# Patient Record
Sex: FEMALE | Race: BLACK OR AFRICAN AMERICAN | NOT HISPANIC OR LATINO | Employment: FULL TIME | URBAN - METROPOLITAN AREA
[De-identification: names, ages, dates, MRNs, and addresses within clinical notes are randomized per-mention and may not be internally consistent; named-entity substitution may affect disease eponyms.]

---

## 2022-12-28 ENCOUNTER — PATIENT OUTREACH (OUTPATIENT)
Dept: ADMINISTRATIVE | Facility: HOSPITAL | Age: 32
End: 2022-12-28
Payer: COMMERCIAL

## 2022-12-28 ENCOUNTER — OFFICE VISIT (OUTPATIENT)
Dept: INTERNAL MEDICINE | Facility: CLINIC | Age: 32
End: 2022-12-28
Payer: COMMERCIAL

## 2022-12-28 ENCOUNTER — LAB VISIT (OUTPATIENT)
Dept: LAB | Facility: HOSPITAL | Age: 32
End: 2022-12-28
Attending: INTERNAL MEDICINE
Payer: COMMERCIAL

## 2022-12-28 DIAGNOSIS — Z11.59 SCREENING FOR VIRAL DISEASE: ICD-10-CM

## 2022-12-28 DIAGNOSIS — Z80.9 FAMILY HISTORY OF CANCER: ICD-10-CM

## 2022-12-28 DIAGNOSIS — Z00.00 ANNUAL PHYSICAL EXAM: Primary | ICD-10-CM

## 2022-12-28 DIAGNOSIS — Z11.4 SCREENING FOR HIV (HUMAN IMMUNODEFICIENCY VIRUS): ICD-10-CM

## 2022-12-28 DIAGNOSIS — E66.09 CLASS 2 OBESITY DUE TO EXCESS CALORIES WITHOUT SERIOUS COMORBIDITY WITH BODY MASS INDEX (BMI) OF 39.0 TO 39.9 IN ADULT: ICD-10-CM

## 2022-12-28 DIAGNOSIS — Z00.00 ANNUAL PHYSICAL EXAM: ICD-10-CM

## 2022-12-28 PROBLEM — E66.812 CLASS 2 OBESITY DUE TO EXCESS CALORIES WITHOUT SERIOUS COMORBIDITY WITH BODY MASS INDEX (BMI) OF 39.0 TO 39.9 IN ADULT: Status: ACTIVE | Noted: 2022-12-28

## 2022-12-28 LAB
ALBUMIN SERPL BCP-MCNC: 3.7 G/DL (ref 3.5–5.2)
ALP SERPL-CCNC: 48 U/L (ref 55–135)
ALT SERPL W/O P-5'-P-CCNC: 12 U/L (ref 10–44)
ANION GAP SERPL CALC-SCNC: 12 MMOL/L (ref 8–16)
AST SERPL-CCNC: 16 U/L (ref 10–40)
BASOPHILS # BLD AUTO: 0.04 K/UL (ref 0–0.2)
BASOPHILS NFR BLD: 0.7 % (ref 0–1.9)
BILIRUB SERPL-MCNC: 0.3 MG/DL (ref 0.1–1)
BUN SERPL-MCNC: 11 MG/DL (ref 6–20)
CALCIUM SERPL-MCNC: 9.2 MG/DL (ref 8.7–10.5)
CHLORIDE SERPL-SCNC: 102 MMOL/L (ref 95–110)
CHOLEST SERPL-MCNC: 225 MG/DL (ref 120–199)
CHOLEST/HDLC SERPL: 2.4 {RATIO} (ref 2–5)
CO2 SERPL-SCNC: 25 MMOL/L (ref 23–29)
CREAT SERPL-MCNC: 0.8 MG/DL (ref 0.5–1.4)
DIFFERENTIAL METHOD: ABNORMAL
EOSINOPHIL # BLD AUTO: 0.1 K/UL (ref 0–0.5)
EOSINOPHIL NFR BLD: 2.4 % (ref 0–8)
ERYTHROCYTE [DISTWIDTH] IN BLOOD BY AUTOMATED COUNT: 13.6 % (ref 11.5–14.5)
EST. GFR  (NO RACE VARIABLE): >60 ML/MIN/1.73 M^2
ESTIMATED AVG GLUCOSE: 105 MG/DL (ref 68–131)
GLUCOSE SERPL-MCNC: 83 MG/DL (ref 70–110)
HBA1C MFR BLD: 5.3 % (ref 4–5.6)
HCT VFR BLD AUTO: 36.5 % (ref 37–48.5)
HCV AB SERPL QL IA: NORMAL
HDLC SERPL-MCNC: 93 MG/DL (ref 40–75)
HDLC SERPL: 41.3 % (ref 20–50)
HGB BLD-MCNC: 11.5 G/DL (ref 12–16)
HIV 1+2 AB+HIV1 P24 AG SERPL QL IA: NORMAL
IMM GRANULOCYTES # BLD AUTO: 0.01 K/UL (ref 0–0.04)
IMM GRANULOCYTES NFR BLD AUTO: 0.2 % (ref 0–0.5)
LDLC SERPL CALC-MCNC: 117 MG/DL (ref 63–159)
LYMPHOCYTES # BLD AUTO: 2.2 K/UL (ref 1–4.8)
LYMPHOCYTES NFR BLD: 39.8 % (ref 18–48)
MCH RBC QN AUTO: 27.9 PG (ref 27–31)
MCHC RBC AUTO-ENTMCNC: 31.5 G/DL (ref 32–36)
MCV RBC AUTO: 89 FL (ref 82–98)
MONOCYTES # BLD AUTO: 0.4 K/UL (ref 0.3–1)
MONOCYTES NFR BLD: 7.3 % (ref 4–15)
NEUTROPHILS # BLD AUTO: 2.7 K/UL (ref 1.8–7.7)
NEUTROPHILS NFR BLD: 49.6 % (ref 38–73)
NONHDLC SERPL-MCNC: 132 MG/DL
NRBC BLD-RTO: 0 /100 WBC
PLATELET # BLD AUTO: 386 K/UL (ref 150–450)
PMV BLD AUTO: 10.1 FL (ref 9.2–12.9)
POTASSIUM SERPL-SCNC: 4.2 MMOL/L (ref 3.5–5.1)
PROT SERPL-MCNC: 7.2 G/DL (ref 6–8.4)
RBC # BLD AUTO: 4.12 M/UL (ref 4–5.4)
SODIUM SERPL-SCNC: 139 MMOL/L (ref 136–145)
TRIGL SERPL-MCNC: 75 MG/DL (ref 30–150)
TSH SERPL DL<=0.005 MIU/L-ACNC: 1.32 UIU/ML (ref 0.4–4)
WBC # BLD AUTO: 5.5 K/UL (ref 3.9–12.7)

## 2022-12-28 PROCEDURE — 36415 COLL VENOUS BLD VENIPUNCTURE: CPT | Performed by: INTERNAL MEDICINE

## 2022-12-28 PROCEDURE — 1159F PR MEDICATION LIST DOCUMENTED IN MEDICAL RECORD: ICD-10-PCS | Mod: CPTII,S$GLB,, | Performed by: INTERNAL MEDICINE

## 2022-12-28 PROCEDURE — 83036 HEMOGLOBIN GLYCOSYLATED A1C: CPT | Performed by: INTERNAL MEDICINE

## 2022-12-28 PROCEDURE — 99999 PR PBB SHADOW E&M-NEW PATIENT-LVL III: ICD-10-PCS | Mod: PBBFAC,,, | Performed by: INTERNAL MEDICINE

## 2022-12-28 PROCEDURE — 1159F MED LIST DOCD IN RCRD: CPT | Mod: CPTII,S$GLB,, | Performed by: INTERNAL MEDICINE

## 2022-12-28 PROCEDURE — 99385 PREV VISIT NEW AGE 18-39: CPT | Mod: S$GLB,,, | Performed by: INTERNAL MEDICINE

## 2022-12-28 PROCEDURE — 84443 ASSAY THYROID STIM HORMONE: CPT | Performed by: INTERNAL MEDICINE

## 2022-12-28 PROCEDURE — 80061 LIPID PANEL: CPT | Performed by: INTERNAL MEDICINE

## 2022-12-28 PROCEDURE — 86803 HEPATITIS C AB TEST: CPT | Performed by: INTERNAL MEDICINE

## 2022-12-28 PROCEDURE — 99385 PR PREVENTIVE VISIT,NEW,18-39: ICD-10-PCS | Mod: S$GLB,,, | Performed by: INTERNAL MEDICINE

## 2022-12-28 PROCEDURE — 87389 HIV-1 AG W/HIV-1&-2 AB AG IA: CPT | Performed by: INTERNAL MEDICINE

## 2022-12-28 PROCEDURE — 85025 COMPLETE CBC W/AUTO DIFF WBC: CPT | Performed by: INTERNAL MEDICINE

## 2022-12-28 PROCEDURE — 99999 PR PBB SHADOW E&M-NEW PATIENT-LVL III: CPT | Mod: PBBFAC,,, | Performed by: INTERNAL MEDICINE

## 2022-12-28 PROCEDURE — 80053 COMPREHEN METABOLIC PANEL: CPT | Performed by: INTERNAL MEDICINE

## 2022-12-28 RX ORDER — LORAZEPAM 1 MG
1000 TABLET ORAL DAILY
COMMUNITY

## 2022-12-28 RX ORDER — PEDIATRIC MULTIVIT 61/D3/VIT K 1500-800
1 CAPSULE ORAL DAILY
COMMUNITY

## 2022-12-28 RX ORDER — PHENYLEPHRINE HCL 10 MG
1 TABLET ORAL DAILY
COMMUNITY

## 2022-12-28 NOTE — PROGRESS NOTES
[facility] Note    Chief Complaint      Chief Complaint   Patient presents with    Establish Care     History of Present Illness      Gia Sawant is a 32 y.o. female who presents today for Annual preventative visit.  Patient comes to appointment alone.    Problem List Items Addressed This Visit       Class 2 obesity due to excess calories without serious comorbidity with body mass index (BMI) of 39.0 to 39.9 in adult    Current Assessment & Plan     Does Pilates 2 times per week.  Walks 3-5 miles in the park multiple times per week. Diet is good when she is exercising.          Other Visit Diagnoses       Annual physical exam    -  Primary    Relevant Orders    CBC Auto Differential    Lipid Panel    Comprehensive Metabolic Panel    Hemoglobin A1C    TSH    Screening for HIV (human immunodeficiency virus)        Relevant Orders    HIV 1/2 Ag/Ab (4th Gen)    Screening for viral disease        Relevant Orders    Hepatitis C Antibody            Health Maintenance   Topic Date Due    Hepatitis C Screening  Never done    Lipid Panel  Never done    TETANUS VACCINE  03/08/2015       History reviewed. No pertinent past medical history.    History reviewed. No pertinent surgical history.    family history includes Arthritis in her father; Colon cancer in her paternal aunt and paternal cousin; Diabetes in her father, mother, sister, and sister; Heart disease in her father; Hyperlipidemia in her father and mother; Hypertension in her father, mother, sister, and sister; Miscarriages / Stillbirths in her mother and sister; Stomach cancer in her mother.    Social History     Tobacco Use    Smoking status: Never    Smokeless tobacco: Never   Substance Use Topics    Alcohol use: Yes     Comment: Special occasions (holidays, birthdays)    Drug use: Yes     Types: Marijuana     Comment: While on vacation       Review of Systems   Constitutional:  Negative for chills and fever.   HENT:  Negative for sore throat.   "  Respiratory:  Negative for cough and shortness of breath.    Cardiovascular:  Negative for chest pain and palpitations.   Gastrointestinal:  Negative for constipation, diarrhea, nausea and vomiting.   Genitourinary:  Negative for dysuria and hematuria.   Musculoskeletal:  Negative for falls.   Neurological:  Negative for headaches.      Outpatient Encounter Medications as of 12/28/2022   Medication Sig Dispense Refill    biotin 10,000 mcg Chew Take 1 capsule by mouth once daily.      cinnamon bark 500 mg capsule Take 1 capsule by mouth once daily.      Lacto-Bif-Sac-Bacil-Strep-bact (MVW COMPLETE FORMUL PROBIOTIC) 40 billion cell -15 mg CpDR Take 1 capsule by mouth once daily.      omega-3 fatty acids fish oil 1,050-1,200 mg Cap capsule Take 1 capsule by mouth once daily.      safflower oil-linoleic acid,co (CLA) 1,000 mg Cap Take 1,000 mg by mouth once daily.       No facility-administered encounter medications on file as of 12/28/2022.        Review of patient's allergies indicates:  No Known Allergies    Physical Exam      Vital Signs  Temp: (P) 98.8 °F (37.1 °C)  Pulse: (P) 82  SpO2: (P) 98 %  BP: (P) 118/76  Pain Score: (P) 0-No pain  Height and Weight  Height: (P) 5' 10" (177.8 cm)  Weight: (P) 123.7 kg (272 lb 11.3 oz)  BSA (Calculated - sq m): (P) 2.47 sq meters  BMI (Calculated): (P) 39.1  Weight in (lb) to have BMI = 25: (P) 173.9]    Physical Exam  Constitutional:       Appearance: She is well-developed.   HENT:      Head: Normocephalic and atraumatic.      Right Ear: External ear normal.      Left Ear: External ear normal.   Eyes:      General:         Right eye: No discharge.         Left eye: No discharge.   Cardiovascular:      Rate and Rhythm: Normal rate and regular rhythm.      Heart sounds: Normal heart sounds. No murmur heard.  Pulmonary:      Effort: Pulmonary effort is normal. No respiratory distress.      Breath sounds: Normal breath sounds.   Abdominal:      General: There is no distension. "      Palpations: Abdomen is soft.      Tenderness: There is no abdominal tenderness. There is no guarding.   Musculoskeletal:         General: Normal range of motion.      Cervical back: Normal range of motion.   Skin:     General: Skin is warm and dry.   Neurological:      Mental Status: She is alert and oriented to person, place, and time.   Psychiatric:         Behavior: Behavior normal.        Laboratory:  CBC:  No results for input(s): WBC, RBC, HGB, HCT, PLT, MCV, MCH, MCHC in the last 2160 hours.  CMP:  No results for input(s): GLU, CALCIUM, ALBUMIN, PROT, NA, K, CO2, CL, BUN, ALKPHOS, ALT, AST, BILITOT in the last 2160 hours.    Invalid input(s): CREATININ  URINALYSIS:  No results for input(s): COLORU, CLARITYU, SPECGRAV, PHUR, PROTEINUA, GLUCOSEU, BILIRUBINCON, BLOODU, WBCU, RBCU, BACTERIA, MUCUS, NITRITE, LEUKOCYTESUR, UROBILINOGEN, HYALINECASTS in the last 2160 hours.   LIPIDS:  No results for input(s): TSH, HDL, CHOL, TRIG, LDLCALC, CHOLHDL, NONHDLCHOL, TOTALCHOLEST in the last 2160 hours.  TSH:  No results for input(s): TSH in the last 2160 hours.  A1C:  No results for input(s): HGBA1C in the last 2160 hours.    Radiology:  No results found in the last 30 days.     Assessment/Plan     Gia Sawant is a 32 y.o.female with:    1. Annual physical exam  - CBC Auto Differential; Future  - Lipid Panel; Future  - Comprehensive Metabolic Panel; Future  - Hemoglobin A1C; Future  - TSH; Future    2. Screening for HIV (human immunodeficiency virus)  - HIV 1/2 Ag/Ab (4th Gen); Future    3. Screening for viral disease  - Hepatitis C Antibody; Future    4. Class 2 obesity due to excess calories without serious comorbidity with body mass index (BMI) of 39.0 to 39.9 in adult      -Continue current medications and maintain follow up with specialists.    -Follow up in about 1 year (around 12/28/2023) for Annual Visit.       Milla Greenberg MD  [facility]

## 2022-12-28 NOTE — LETTER
AUTHORIZATION FOR RELEASE OF   CONFIDENTIAL INFORMATION    Dear [facility],    We are seeing Gia Sawant, date of birth 1990, in the clinic at [facility]. Milla Greenberg MD is the patient's PCP. Gia Sawant has an outstanding lab/procedure at the time we reviewed her chart. In order to help keep her health information updated, she has authorized us to request the following medical record(s):        (  )  MAMMOGRAM                                      (  )  COLONOSCOPY      (XX)  PAP SMEAR                                          (  )  OUTSIDE LAB RESULTS     (  )  DEXA SCAN                                          (  )  EYE EXAM            (  )  FOOT EXAM                                          (  )  ENTIRE RECORD     (  )  OUTSIDE IMMUNIZATIONS                 (  )  _______________         Please fax records to Milla Greenberg MD, [phone].     If you have any questions, please contact EDGARDO Rivera at [phone].           Patient Name: Gia Sawant  : 1990  Patient Phone #: [phone]

## 2022-12-28 NOTE — ASSESSMENT & PLAN NOTE
Does Pilates 2 times per week.  Walks 3-5 miles in the park multiple times per week. Diet is good when she is exercising.

## 2022-12-28 NOTE — PROGRESS NOTES
Health Maintenance Due   Topic Date Due    Cervical Cancer Screening  Never done    TETANUS VACCINE  03/08/2015    COVID-19 Vaccine (3 - Booster for Pfizer series) 12/02/2021     Chart review done. HM updated. Immunizations reviewed & updated. Care Everywhere updated.  SYL sent to [facility] for Pap results.

## 2022-12-29 ENCOUNTER — PATIENT MESSAGE (OUTPATIENT)
Dept: INTERNAL MEDICINE | Facility: CLINIC | Age: 32
End: 2022-12-29
Payer: COMMERCIAL

## 2022-12-29 DIAGNOSIS — D64.9 ANEMIA, UNSPECIFIED TYPE: Primary | ICD-10-CM

## 2022-12-30 ENCOUNTER — LAB VISIT (OUTPATIENT)
Dept: LAB | Facility: HOSPITAL | Age: 32
End: 2022-12-30
Attending: INTERNAL MEDICINE
Payer: COMMERCIAL

## 2022-12-30 DIAGNOSIS — D64.9 ANEMIA, UNSPECIFIED TYPE: ICD-10-CM

## 2022-12-30 LAB
FERRITIN SERPL-MCNC: 23 NG/ML (ref 20–300)
IRON SERPL-MCNC: 62 UG/DL (ref 30–160)
SATURATED IRON: 12 % (ref 20–50)
TOTAL IRON BINDING CAPACITY: 499 UG/DL (ref 250–450)
TRANSFERRIN SERPL-MCNC: 337 MG/DL (ref 200–375)

## 2022-12-30 PROCEDURE — 36415 COLL VENOUS BLD VENIPUNCTURE: CPT | Performed by: INTERNAL MEDICINE

## 2022-12-30 PROCEDURE — 82728 ASSAY OF FERRITIN: CPT | Performed by: INTERNAL MEDICINE

## 2022-12-30 PROCEDURE — 84466 ASSAY OF TRANSFERRIN: CPT | Performed by: INTERNAL MEDICINE

## 2023-01-05 ENCOUNTER — PATIENT MESSAGE (OUTPATIENT)
Dept: HEMATOLOGY/ONCOLOGY | Facility: CLINIC | Age: 33
End: 2023-01-05
Payer: COMMERCIAL

## 2023-01-09 ENCOUNTER — PATIENT MESSAGE (OUTPATIENT)
Dept: INTERNAL MEDICINE | Facility: CLINIC | Age: 33
End: 2023-01-09
Payer: COMMERCIAL

## 2023-03-13 ENCOUNTER — PATIENT MESSAGE (OUTPATIENT)
Dept: HEMATOLOGY/ONCOLOGY | Facility: CLINIC | Age: 33
End: 2023-03-13
Payer: COMMERCIAL

## 2023-04-26 ENCOUNTER — PATIENT MESSAGE (OUTPATIENT)
Dept: HEMATOLOGY/ONCOLOGY | Facility: CLINIC | Age: 33
End: 2023-04-26

## 2023-04-26 ENCOUNTER — OFFICE VISIT (OUTPATIENT)
Dept: HEMATOLOGY/ONCOLOGY | Facility: CLINIC | Age: 33
End: 2023-04-26
Payer: COMMERCIAL

## 2023-04-26 DIAGNOSIS — Z71.83 ENCOUNTER FOR NONPROCREATIVE GENETIC COUNSELING: Primary | ICD-10-CM

## 2023-04-26 DIAGNOSIS — Z80.0 FAMILY HISTORY OF COLON CANCER: ICD-10-CM

## 2023-04-26 DIAGNOSIS — Z80.0 FAMILY HISTORY OF STOMACH CANCER: ICD-10-CM

## 2023-04-26 PROCEDURE — 1159F PR MEDICATION LIST DOCUMENTED IN MEDICAL RECORD: ICD-10-PCS | Mod: CPTII,S$GLB,, | Performed by: NURSE PRACTITIONER

## 2023-04-26 PROCEDURE — 1159F MED LIST DOCD IN RCRD: CPT | Mod: CPTII,S$GLB,, | Performed by: NURSE PRACTITIONER

## 2023-04-26 PROCEDURE — 99204 PR OFFICE/OUTPT VISIT, NEW, LEVL IV, 45-59 MIN: ICD-10-PCS | Mod: S$GLB,,, | Performed by: NURSE PRACTITIONER

## 2023-04-26 PROCEDURE — 99999 PR PBB SHADOW E&M-EST. PATIENT-LVL III: CPT | Mod: PBBFAC,,, | Performed by: NURSE PRACTITIONER

## 2023-04-26 PROCEDURE — 99204 OFFICE O/P NEW MOD 45 MIN: CPT | Mod: S$GLB,,, | Performed by: NURSE PRACTITIONER

## 2023-04-26 PROCEDURE — 99999 PR PBB SHADOW E&M-EST. PATIENT-LVL III: ICD-10-PCS | Mod: PBBFAC,,, | Performed by: NURSE PRACTITIONER

## 2023-04-26 NOTE — Clinical Note
Kevin, I would like for pt to see Dr. Loza in clinic to determine when to begin her screening colonoscopies.  Two paternal relatives diagnosed with CRC at young ages.  Mother with stomach cancer ().  Referral is in.  Thank you! Bhaskar Colin

## 2023-04-26 NOTE — PROGRESS NOTES
"Cancer Genetics  Hereditary and High-Risk Clinic  Department of Hematology and Oncology  [facility]    [facility]    Date of Service:  23  Visit Provider:  Bhaskar Colin DNP  Collaborating Physician:  Serena Kaminski MD    Patient ID  Name: Gia Sawant    : 1990    MRN: [id number]      Referring Provider  Milla Greenberg MD  [address]  [address] [place] [address]    SUBJECTIVE      Chief Complaint: Genetic Evaluation (New patient) and Family History of cancer    History of Present Illness (HPI):  Gia Sawant ("Gia"), 32 y.o., assigned female sex at birth, is new to the [facility] and to me.  She was referred by Dr. Milla Greenberg, with the Internal Medicine dept., for cancer-genetic risk assessment given her family history of cancer.    Focused Medical History  Genetic testing:  No  Cancer:  No  Colon polyp:  N/A - No history of colonoscopy  Other pertinent lesion:  No  Pancreatitis or pancreatic cyst:  No  Reproductive organs:  Intact  Blood disorder:  No  Future reproduction desired?  No    Breast Cancer Risk Assessment Questionnaire  Weight:  255 lb  Height:  5'10"  Mammographic breast density:  No history of mammogram   Age at menarche:  12y  Age at first live childbirth:  Nulliparous  Menopausal status:  premenopausal  Age at menopause, if applicable:  n/a  Hormone replacement therapy use history:  never  Breast biopsy history and findings:  never  Thoracic radiation therapy history:  never  Breast cancer susceptibility gene testing history:  See above  Ashkenazi [employer]ec[employer]ism ancestry:  See below  Family history of breast cancer, ovarian cancer, and genetic testing:  See below    Focused Social History  Nurse at [employer] and [employer]    Ancestry  Ashkenazi Pentec[employer]alism:  No    Family Oncologic History  ** The pedigree below was placed into this note in a size that produced a legible font.  If it is appearing " small/illegible on your screen, expand this note window horizontally. **    Consanguinity:  No  Hereditary cancer genetic testing in blood relatives:  No  Other than noted, no known history of cancer in relatives depicted in the pedigree or in:  maternal first cousins, maternal extended family, paternal first cousins.  Other than noted, no known family history of benign tumors or of colon polyps.  Unaware of or limited knowledge of medical history of:  paternal extended family.    Review of Systems  See HPI.       OBJECTIVE     Physical Exam  Very pleasant patient.  Unaccompanied.  Vitals signs:  Reviewed:  Vitals:    04/26/23 1119   PainSc: 0-No pain   (Pain is rated on scale of 0-10 on which 10=worst.)  Distress score:  Reviewed: (3/10, on scale of 0-10 on which 10=worst).  Constitutional      Appearance:  Appears well developed and well nourished. No distress.   Pulmonary     Effort:  Normal.  Neurological     Mental Status:  Alert and oriented.     Coordination:  Normal.   Psychiatric         Mood and Affect:  Normal.     Thought Content:  Normal.     Speech:  Normal.     Behavior:  Normal.     Judgment:  Normal.  Genetics-specific     It is my assessment that the patient is ready to proceed with cancer genetic testing from a psychosocial perspective.    CANCER GENETIC COUNSELING      Cancer Genetics     Germline cancer genetic testing is the testing of genes associated with cancer, known as cancer susceptibility genes.  Just as these genes are inherited from parents--one copy of each gene from each parent--mutations in these genes can be inherited, as well.  A mutation in a cancer susceptibility gene adversely affects the gene's ability to prevent cancer; therefore, carriers of cancer susceptibility gene mutations may be at increased risk for certain cancers.     Causes of Cancer    Only approximately 5%-10% of cancers are caused by an inherited cancer susceptibility gene mutation; rather, the majority of  cancers are sporadic.  Causes of sporadic cancers may include environmental risk factors, lifestyle risk factors, and non-modifiable risk factors.  It is important to note that members of a family often share not only their genetics but also other risk factors, including environmental and lifestyle risk factors, so cancers can be familial.     Potential Results of Genetic Testing, and Their Implications     Potential results of genetic testing include positive, negative, and variant of unknown significance (VUS).    Positive:  A positive result indicates the presence of at least one clinically significant gene mutation, and the individual's associated cancer risks vary depending upon the cancer susceptibility gene(s) in which there is/are a mutation(s).  With a positive result, depending upon the specific result and the individual's clinical history, modified risk management may be recommended, including measures for risk reduction and/or surveillance; however, there are not always effective strategies for modified risk management.  Negative:  A negative result indicates that no clinically significant mutations were identified in the gene(s) tested.   A negative result in the patient does not indicate that she cannot develop cancer, and, in fact, she may even be at increased risk for cancer based on shared risk factors with affected relatives.   VUS:  A VUS indicates that there is not presently enough data for the laboratory to make a determination as to whether the genetic variant is clinically significant.  VUSs are not typically acted upon clinically.       Genetic Mutation Inheritance     When an individual tests positive for a gene mutation, her first-degree relatives each have a 50% chance of carrying the same mutation, and other, more distant blood relatives can also be at risk of carrying the same mutation.       Genetic Discrimination     Genetic discrimination occurs when individuals are discriminated  against on the basis of their genetic information.    The Genetic Information Nondiscrimination Act of 2008 (ALEX) is [place] federal legislation that provides some protections against use of an individual's genetic information by their health insurer and by their employer.      Title I of ALEX prohibits most health insurers from utilizing an individual's genetic information to make decisions regarding insurance eligibility or premium charges.  This protection does not apply to health insurance obtained through a job with the , and it may not apply to health insurance obtained through the Federal Employees Health Benefits Plan.    Title II of ALEX prohibits covered entities, in many cases, from requesting or requiring the genetic information of employees and applicants and from using said information to make employment decisions.  This protection does not apply to employers with fewer than 15 employees or to the .    ALEX does not protect individuals from genetic discrimination by any other type of policy or entity, including but not limited to life insurance, disability insurance, long-term care insurance,  benefits, and  Health Services benefits.    Genetic Testing Logistics     An outside laboratory would perform the testing after a blood sample is collected here at the [facility].    There is a potential for the patient to incur out-of-pocket costs related to genetic testing.    One can expect this genetic testing to take approximately three weeks to result.    Post-test genetic counseling can be conducted once the genetic testing results are available.    Also discussed:  Potential for psychological implications related to genetic testing.  CDH1 gene and hereditary diffuse gastric cancer (HDGC) syndrome, as well as guideline recommendation for mastectomy in HDGC.     ASSESSMENT / PLAN        ICD-10-CM ICD-9-CM   1. Encounter for nonprocreative genetic counseling  Z71.83  "V26.33   2. Family history of stomach cancer  Z80.0 V16.0   3. Family history of colon cancer  Z80.0 V16.0     1. Encounter for nonprocreative genetic counseling  Gia Sawant ("Gia"), 32 y.o., assigned female sex at birth, is new to the [facility] and to me.  She was referred by Dr. Milla Greenberg, with the Internal Medicine dept., for cancer-genetic risk assessment given her family history of cancer.  Cancer genetic risk assessment and pre-test cancer genetic counseling were conducted.  From a clinical standpoint, germline cancer susceptibility gene testing is recommended for Gia for the near future.  Offered Gia options of proceeding with hereditary cancer susceptibility gene testing at this time versus delaying/declining such.  Gia elects to defer genetic testing for hereditary cancer syndromes at this time and was advised to notify me if she desires to proceed in the future; she has my contact information.     2. Family history of stomach cancer  - Ambulatory referral/consult to Genetics:  COMPLETED 04/26/2023     3. Family history of colon cancer  - Ambulatory referral/consult to Genetics:  COMPLETED 04/26/2023   - Ambulatory referral/consult to Gastroenterology; Future       SEE AFTER-VISIT SUMMARY.       Follow-up:  Follow up in about 1 year (around 4/26/2024) for re-discussion regarding genetic testing, if the patient does not reach out sooner to test.      Questions were encouraged and answered to the patient's satisfaction, and she verbalized understanding of the information and agreement with the plan.           Approximately 37 minutes were spent face-to-face with the patient.  Approximately 50 minutes in total were spent on this encounter, which includes face-to-face time and non-face-to-face time preparing to see the patient (e.g., review of tests), obtaining and/or reviewing separately obtained history, documenting clinical information in the " electronic or other health record, independently interpreting results (not separately reported) and communicating results to the patient/family/caregiver, or care coordination (not separately reported).         Bhaskar Colin, DNP, APRN, FNP-BC, AOCNP, CGRA  Nurse Practitioner, Hereditary and High-Risk Clinic  Department of Hematology and Oncology  [facility]        CC:  Dr. Milla Greenberg

## 2023-04-26 NOTE — PATIENT INSTRUCTIONS
"Information to Follow Your Cancer Genetics Visit    Jd Nielsen,    It was nice meeting you!  Below is some information to follow your cancer genetics enetics visit.  Please read this information and let me know if you have any questions or concerns.    Health Maintenance / Cancer Risks and Risk Management    Only a small percentage (approximately 5%-10%) of cancers are hereditary, meaning caused by an inherited gene mutation; rather, most cancers are sporadic.  Environmental factors, lifestyle factors, and even factors beyond our control play a significant role in the development of many cancers.  As such, an individual can develop cancer even if they don't have an identifiable hereditary predisposition.  Furthermore, even with negative genetic testing, an individual can still be at increased risk for certain cancers, as they may independently have risk factors for those cancers and/or may share risk factors with their family members affected by cancer.  For these reasons, it is strongly recommended that you ensure that your healthcare providers are aware of and up-to-date on your personal and family history so that your medical management, including cancer screenings, can be based in part off of this information.      It is recommended that you be established with a primary care provider (PCP), whom you should see at least annually for routine health maintenance, including cancer screenings.  If you are not established with a PCP, please contact me so I can refer you.    There are models that help us to "calculate" your breast cancer risk.  I used the Tyrer-Cuzick model, version 8.0b, to calculate your lifetime (to age 85) risk.   Your current, estimated risk for developing breast cancer is as follows:  lifetime (to age 85) 14.5% (average risk), even if you have mammographically "extremely dense" breast tissue (which right now we do not know).  The below recommendations are made based in part on these risk " scores.    The following cancer screenings are currently recommended for you (please note that these recommendations can change based on updates to clinical guidelines and/or with changes to your medical or family history and are therefore only considered accurate as of the date on which this letter was composed; additional and/or alternative screenings may be recommended by your healthcare providers, and recommendations from your healthcare providers directly managing these risks supersede the below recommendations):  Ongoing breast cancer risk assessment (as your breast cancer risk continually changes) and breast cancer risk-reduction counseling.  I (Bhsakar Colin NP) would be happy to perform this any time you reach out to me and request such.  Ongoing breast awareness, meaning you are familiar with your breasts, perform breast self-exams at least monthly, and promptly report changes/concerns to your gynecology provider or, if you have one, your breast specialist.  Clinical breast exam (CBE) every 12 months with your gynecology provider.  Annual mammogram with tomosynthesis (ie, 3-D mammogram).  This would be ordered by your gynecology provider or, if you have one, your breast specialist.  Annual visit with your gynecology provider, which may include pelvic exam and/or Pap smear/HPV testing.  I am going to refer you to a gastroenterology (GI) provider now for a clinic visit to determine whether you should undergo your baseline colonoscopy now given your two paternal relatives' early-onset colon cancers.  With regard to family history, if a first-degree relative has a colorectal polyp history including any of the following characteristics, please let me (Bhaskar Colin NP) know:  Adenoma with high-grade dysplasia, adenoma or sessile serrated polyp/adenoma 1 cm or larger in size, villous or tubulovillous adenoma, traditional serrated adenoma (TSA), sessile serrated lesion/polyp/adenoma with any dysplasia, cumulatively  10 or more polyps.  Additionally, please let me know if you learn moving forward that any additional first-, second-, or third-degree relative has had or is diagnosed with colorectal cancer.  Consider annual total-body skin examination (TBSE) with your dermatology provider.    Referrals placed based on the above:  Please notify me if you do not hear from these office(s) within the next 2 weeks to schedule an appointment.  [facility]    If you are not established with any of the other above healthcare specialists, please let me know so I can refer you.    Some information regarding general cancer risk reduction:  It is recommended to avoid tobacco use; be physically active; maintain a healthy weight; eat a diet rich in fruits, vegetables, and whole grains and low in saturated/trans fat, red meat, and processed meat; limit alcohol consumption (zero is best); protect against sexually transmitted infections; protect against the sun, and avoid tanning beds; and get regular screenings for cancers as recommended by your healthcare team.    Regarding Your Relatives    Your relatives also may be at increased risk for certain cancers, depending upon their own personal and family history; therefore, it is important that they, too, ensure that their own healthcare providers are aware of and up-to-date on their personal and family history so that their medical management, including cancer screenings, can be based in part off of this information.      Additionally, it is possible for your relatives to have hereditary cancer susceptibility gene mutations even if/when you have negative genetic testing.      Your relatives should speak with a healthcare provider about their cancer risks and whether genetic counseling and potentially testing may be indicated for them.  Anyone interested in pursuing cancer genetic counseling/testing may contact the [facility] at [phone] to schedule an appointment or may visit  [email or web address] to locate a genetic specialist near them.    Follow-up    Please continue to follow up with all healthcare providers as directed.    Moving forward, please update me with any changes to--or new information learned about--your personal or family history and with any relative's genetic testing results.      If at any point you wish to pursue hereditary cancer genetic testing, as I do recommend for you from a clinical standpoint, please reach out so I can facilitate that for you; otherwise, please follow up with me in 1 year to readdress the matter.    In the meantime, please don't hesitate to reach out with any questions or concerns.    Sincerely,        Bhaskar Colin, CYRIL, APRN, FNP-BC, AOCNP, CGRA  Nurse Practitioner, Hereditary and High-Risk Clinic  Department of Hematology and Oncology  [facility]  Phone:  [phone]

## 2023-05-31 ENCOUNTER — OFFICE VISIT (OUTPATIENT)
Dept: OBSTETRICS AND GYNECOLOGY | Facility: CLINIC | Age: 33
End: 2023-05-31
Attending: STUDENT IN AN ORGANIZED HEALTH CARE EDUCATION/TRAINING PROGRAM
Payer: COMMERCIAL

## 2023-05-31 VITALS
BODY MASS INDEX: 37.84 KG/M2 | HEIGHT: 70 IN | WEIGHT: 264.31 LBS | SYSTOLIC BLOOD PRESSURE: 134 MMHG | DIASTOLIC BLOOD PRESSURE: 88 MMHG

## 2023-05-31 DIAGNOSIS — Z12.4 SCREENING FOR CERVICAL CANCER: ICD-10-CM

## 2023-05-31 DIAGNOSIS — Z11.51 SCREENING FOR HPV (HUMAN PAPILLOMAVIRUS): ICD-10-CM

## 2023-05-31 DIAGNOSIS — Z11.3 SCREENING FOR STDS (SEXUALLY TRANSMITTED DISEASES): ICD-10-CM

## 2023-05-31 DIAGNOSIS — Z01.419 ENCOUNTER FOR GYNECOLOGICAL EXAMINATION: Primary | ICD-10-CM

## 2023-05-31 PROCEDURE — 99385 PR PREVENTIVE VISIT,NEW,18-39: ICD-10-PCS | Mod: S$GLB,,, | Performed by: STUDENT IN AN ORGANIZED HEALTH CARE EDUCATION/TRAINING PROGRAM

## 2023-05-31 PROCEDURE — 1159F MED LIST DOCD IN RCRD: CPT | Mod: CPTII,S$GLB,, | Performed by: STUDENT IN AN ORGANIZED HEALTH CARE EDUCATION/TRAINING PROGRAM

## 2023-05-31 PROCEDURE — 99999 PR PBB SHADOW E&M-EST. PATIENT-LVL III: ICD-10-PCS | Mod: PBBFAC,,, | Performed by: STUDENT IN AN ORGANIZED HEALTH CARE EDUCATION/TRAINING PROGRAM

## 2023-05-31 PROCEDURE — 1160F RVW MEDS BY RX/DR IN RCRD: CPT | Mod: CPTII,S$GLB,, | Performed by: STUDENT IN AN ORGANIZED HEALTH CARE EDUCATION/TRAINING PROGRAM

## 2023-05-31 PROCEDURE — 88175 CYTOPATH C/V AUTO FLUID REDO: CPT | Performed by: STUDENT IN AN ORGANIZED HEALTH CARE EDUCATION/TRAINING PROGRAM

## 2023-05-31 PROCEDURE — 3008F PR BODY MASS INDEX (BMI) DOCUMENTED: ICD-10-PCS | Mod: CPTII,S$GLB,, | Performed by: STUDENT IN AN ORGANIZED HEALTH CARE EDUCATION/TRAINING PROGRAM

## 2023-05-31 PROCEDURE — 1159F PR MEDICATION LIST DOCUMENTED IN MEDICAL RECORD: ICD-10-PCS | Mod: CPTII,S$GLB,, | Performed by: STUDENT IN AN ORGANIZED HEALTH CARE EDUCATION/TRAINING PROGRAM

## 2023-05-31 PROCEDURE — 99385 PREV VISIT NEW AGE 18-39: CPT | Mod: S$GLB,,, | Performed by: STUDENT IN AN ORGANIZED HEALTH CARE EDUCATION/TRAINING PROGRAM

## 2023-05-31 PROCEDURE — 87591 N.GONORRHOEAE DNA AMP PROB: CPT | Performed by: STUDENT IN AN ORGANIZED HEALTH CARE EDUCATION/TRAINING PROGRAM

## 2023-05-31 PROCEDURE — 3008F BODY MASS INDEX DOCD: CPT | Mod: CPTII,S$GLB,, | Performed by: STUDENT IN AN ORGANIZED HEALTH CARE EDUCATION/TRAINING PROGRAM

## 2023-05-31 PROCEDURE — 99999 PR PBB SHADOW E&M-EST. PATIENT-LVL III: CPT | Mod: PBBFAC,,, | Performed by: STUDENT IN AN ORGANIZED HEALTH CARE EDUCATION/TRAINING PROGRAM

## 2023-05-31 PROCEDURE — 1160F PR REVIEW ALL MEDS BY PRESCRIBER/CLIN PHARMACIST DOCUMENTED: ICD-10-PCS | Mod: CPTII,S$GLB,, | Performed by: STUDENT IN AN ORGANIZED HEALTH CARE EDUCATION/TRAINING PROGRAM

## 2023-05-31 PROCEDURE — 3079F DIAST BP 80-89 MM HG: CPT | Mod: CPTII,S$GLB,, | Performed by: STUDENT IN AN ORGANIZED HEALTH CARE EDUCATION/TRAINING PROGRAM

## 2023-05-31 PROCEDURE — 3075F SYST BP GE 130 - 139MM HG: CPT | Mod: CPTII,S$GLB,, | Performed by: STUDENT IN AN ORGANIZED HEALTH CARE EDUCATION/TRAINING PROGRAM

## 2023-05-31 PROCEDURE — 3079F PR MOST RECENT DIASTOLIC BLOOD PRESSURE 80-89 MM HG: ICD-10-PCS | Mod: CPTII,S$GLB,, | Performed by: STUDENT IN AN ORGANIZED HEALTH CARE EDUCATION/TRAINING PROGRAM

## 2023-05-31 PROCEDURE — 87624 HPV HI-RISK TYP POOLED RSLT: CPT | Performed by: STUDENT IN AN ORGANIZED HEALTH CARE EDUCATION/TRAINING PROGRAM

## 2023-05-31 PROCEDURE — 3075F PR MOST RECENT SYSTOLIC BLOOD PRESS GE 130-139MM HG: ICD-10-PCS | Mod: CPTII,S$GLB,, | Performed by: STUDENT IN AN ORGANIZED HEALTH CARE EDUCATION/TRAINING PROGRAM

## 2023-05-31 NOTE — PROGRESS NOTES
Chief Complaint: Well Woman Exam     HPI:      Gia Sawant is a 32 y.o. G0 who presents today for well woman exam and to establish GYN care. Regular cycles, heavy flow, lasts 6 days.  Mild dysmenorrhea resolves with ibuprofen. Menarche was 10 yo. LMP: Patient's last menstrual period was 05/23/2023 (exact date).  Denies other GYN issues, problems, or complaints. Specifically, patient denies abnormal vaginal bleeding, discharge, pelvic pain, urinary problems. Ms. Sawant is currently sexually active with a single male partner. She is currently using condoms for contraception. She would like STD screening today.    Previous Pap: reports all normal, unsure of last pap    History reviewed. No pertinent past medical history.    History reviewed. No pertinent surgical history.    Social History     Socioeconomic History    Marital status: Single   Tobacco Use    Smoking status: Never    Smokeless tobacco: Never   Substance and Sexual Activity    Alcohol use: Yes     Comment: Special occasions (holidays, birthdays)    Drug use: Yes     Types: Marijuana     Comment: While on vacation    Sexual activity: Yes     Partners: Male     Birth control/protection: Coitus interruptus, Condom     Family History   Problem Relation Age of Onset    Other Mother         pituitary tumor (benign?); benign breast tumors    Diabetes Mother     Hyperlipidemia Mother     Hypertension Mother     Miscarriages / Stillbirths Mother     Stomach cancer Mother 63        de mani stage IV    Arthritis Father     Diabetes Father     Heart disease Father     Hyperlipidemia Father     Hypertension Father     Diabetes Sister     Hypertension Sister     Diabetes Sister     Hypertension Sister     Miscarriages / Stillbirths Sister     Colon cancer Paternal Aunt         surv'd a few yrs    Heart disease Maternal Grandfather     Heart disease Maternal Grandmother     Breast cancer Other     Cancer Other         type?    Lung cancer Other     Heart failure  "Paternal Grandfather     Colon cancer Other      Review of patient's allergies indicates:  No Known Allergies    OB History    No obstetric history on file.         Physical Exam:      PHYSICAL EXAM:  /88 (BP Location: Left arm, Patient Position: Sitting)   Ht 5' 10" (1.778 m)   Wt 119.9 kg (264 lb 5.3 oz)   LMP 05/23/2023 (Exact Date)   BMI 37.93 kg/m²   Body mass index is 37.93 kg/m².     APPEARANCE: Well nourished, well developed, in no acute distress.  PSYCH: Appropriate mood and affect.  SKIN: No acne or hirsutism  NECK: Neck symmetric without masses  NODES: No inguinal, axillary, or supraclavicular lymph node enlargement  ABDOMEN: Soft.  No tenderness or masses.    CARDIOVASCULAR: No edema of peripheral extremities  BREASTS: Symmetrical, no visible skin lesions. No palpable masses. No nipple discharge bilaterally. Bilateral nipple piercing.  PELVIC: Normal external genitalia without lesions.  Normal hair distribution.  Adequate perineal body, normal urethral meatus.  Vagina moist and well rugated. Without lesions. With thin yellow discharge.  Cervix pink, without lesions, discharge or tenderness.  No significant cystocele or rectocele.  Bimanual exam shows uterus to be normal size, regular, mobile and nontender.  Adnexa without masses or tenderness.      Assessment/Plan:     Encounter for gynecological examination    Screening for cervical cancer  -     Liquid-Based Pap Smear, Screening    Screening for HPV (human papillomavirus)  -     HPV High Risk Genotypes, PCR    Screening for STDs (sexually transmitted diseases)  -     C. trachomatis/N. gonorrhoeae by AMP DNA [facility]; Cervicovaginal    - pelvic exam normal  - pap/hpv collected  - gc/cz collected  - routine labs with PCP    Follow up in about 1 year (around 5/31/2024) for annual exam.    Counseling:     Patient was counseled today on current ASCCP pap guidelines, the recommendation for yearly physical exams, safe driving habits, breast self " awareness. She is to see her PCP for other health maintenance.     Use of the [employer] Patient Portal discussed and encouraged during today's visit.     Krista Bates MD  Obstetrics and Gynecology  [facility]

## 2023-06-01 LAB
C TRACH DNA SPEC QL NAA+PROBE: NOT DETECTED
N GONORRHOEA DNA SPEC QL NAA+PROBE: NOT DETECTED

## 2023-06-07 LAB
FINAL PATHOLOGIC DIAGNOSIS: NORMAL
HPV HR 12 DNA SPEC QL NAA+PROBE: NEGATIVE
HPV16 AG SPEC QL: NEGATIVE
HPV18 DNA SPEC QL NAA+PROBE: NEGATIVE
Lab: NORMAL

## 2023-07-19 ENCOUNTER — PATIENT MESSAGE (OUTPATIENT)
Dept: RESEARCH | Facility: HOSPITAL | Age: 33
End: 2023-07-19
Payer: COMMERCIAL

## 2024-01-10 ENCOUNTER — LAB VISIT (OUTPATIENT)
Dept: LAB | Facility: HOSPITAL | Age: 34
End: 2024-01-10
Attending: INTERNAL MEDICINE
Payer: COMMERCIAL

## 2024-01-10 ENCOUNTER — OFFICE VISIT (OUTPATIENT)
Dept: PRIMARY CARE CLINIC | Facility: CLINIC | Age: 34
End: 2024-01-10
Payer: COMMERCIAL

## 2024-01-10 VITALS
OXYGEN SATURATION: 96 % | HEART RATE: 86 BPM | WEIGHT: 288.38 LBS | BODY MASS INDEX: 41.29 KG/M2 | HEIGHT: 70 IN | DIASTOLIC BLOOD PRESSURE: 84 MMHG | SYSTOLIC BLOOD PRESSURE: 136 MMHG

## 2024-01-10 DIAGNOSIS — Z00.00 ANNUAL PHYSICAL EXAM: Primary | ICD-10-CM

## 2024-01-10 DIAGNOSIS — E66.09 CLASS 2 OBESITY DUE TO EXCESS CALORIES WITHOUT SERIOUS COMORBIDITY WITH BODY MASS INDEX (BMI) OF 39.0 TO 39.9 IN ADULT: ICD-10-CM

## 2024-01-10 DIAGNOSIS — E61.1 IRON DEFICIENCY: ICD-10-CM

## 2024-01-10 DIAGNOSIS — E66.01 CLASS 3 SEVERE OBESITY DUE TO EXCESS CALORIES WITHOUT SERIOUS COMORBIDITY WITH BODY MASS INDEX (BMI) OF 40.0 TO 44.9 IN ADULT: ICD-10-CM

## 2024-01-10 DIAGNOSIS — Z00.00 ANNUAL PHYSICAL EXAM: ICD-10-CM

## 2024-01-10 PROBLEM — E66.813 CLASS 3 SEVERE OBESITY DUE TO EXCESS CALORIES WITHOUT SERIOUS COMORBIDITY WITH BODY MASS INDEX (BMI) OF 40.0 TO 44.9 IN ADULT: Status: ACTIVE | Noted: 2024-01-10

## 2024-01-10 PROBLEM — E66.812 CLASS 2 OBESITY DUE TO EXCESS CALORIES WITHOUT SERIOUS COMORBIDITY WITH BODY MASS INDEX (BMI) OF 39.0 TO 39.9 IN ADULT: Status: RESOLVED | Noted: 2022-12-28 | Resolved: 2024-01-10

## 2024-01-10 LAB
ALBUMIN SERPL BCP-MCNC: 3.7 G/DL (ref 3.5–5.2)
ALP SERPL-CCNC: 41 U/L (ref 55–135)
ALT SERPL W/O P-5'-P-CCNC: 11 U/L (ref 10–44)
ANION GAP SERPL CALC-SCNC: 8 MMOL/L (ref 8–16)
AST SERPL-CCNC: 15 U/L (ref 10–40)
BASOPHILS # BLD AUTO: 0.05 K/UL (ref 0–0.2)
BASOPHILS NFR BLD: 0.9 % (ref 0–1.9)
BILIRUB SERPL-MCNC: 0.3 MG/DL (ref 0.1–1)
BUN SERPL-MCNC: 11 MG/DL (ref 6–20)
CALCIUM SERPL-MCNC: 9.2 MG/DL (ref 8.7–10.5)
CHLORIDE SERPL-SCNC: 105 MMOL/L (ref 95–110)
CHOLEST SERPL-MCNC: 212 MG/DL (ref 120–199)
CHOLEST/HDLC SERPL: 2.4 {RATIO} (ref 2–5)
CO2 SERPL-SCNC: 27 MMOL/L (ref 23–29)
CREAT SERPL-MCNC: 0.8 MG/DL (ref 0.5–1.4)
DIFFERENTIAL METHOD BLD: ABNORMAL
EOSINOPHIL # BLD AUTO: 0.2 K/UL (ref 0–0.5)
EOSINOPHIL NFR BLD: 3.5 % (ref 0–8)
ERYTHROCYTE [DISTWIDTH] IN BLOOD BY AUTOMATED COUNT: 13.1 % (ref 11.5–14.5)
EST. GFR  (NO RACE VARIABLE): >60 ML/MIN/1.73 M^2
ESTIMATED AVG GLUCOSE: 111 MG/DL (ref 68–131)
FERRITIN SERPL-MCNC: 43 NG/ML (ref 20–300)
GLUCOSE SERPL-MCNC: 88 MG/DL (ref 70–110)
HBA1C MFR BLD: 5.5 % (ref 4–5.6)
HCT VFR BLD AUTO: 35.8 % (ref 37–48.5)
HDLC SERPL-MCNC: 88 MG/DL (ref 40–75)
HDLC SERPL: 41.5 % (ref 20–50)
HGB BLD-MCNC: 11.6 G/DL (ref 12–16)
IMM GRANULOCYTES # BLD AUTO: 0.01 K/UL (ref 0–0.04)
IMM GRANULOCYTES NFR BLD AUTO: 0.2 % (ref 0–0.5)
IRON SERPL-MCNC: 60 UG/DL (ref 30–160)
LDLC SERPL CALC-MCNC: 113.2 MG/DL (ref 63–159)
LYMPHOCYTES # BLD AUTO: 2.2 K/UL (ref 1–4.8)
LYMPHOCYTES NFR BLD: 38.5 % (ref 18–48)
MCH RBC QN AUTO: 29.4 PG (ref 27–31)
MCHC RBC AUTO-ENTMCNC: 32.4 G/DL (ref 32–36)
MCV RBC AUTO: 91 FL (ref 82–98)
MONOCYTES # BLD AUTO: 0.5 K/UL (ref 0.3–1)
MONOCYTES NFR BLD: 8.5 % (ref 4–15)
NEUTROPHILS # BLD AUTO: 2.8 K/UL (ref 1.8–7.7)
NEUTROPHILS NFR BLD: 48.4 % (ref 38–73)
NONHDLC SERPL-MCNC: 124 MG/DL
NRBC BLD-RTO: 0 /100 WBC
PLATELET # BLD AUTO: 352 K/UL (ref 150–450)
PMV BLD AUTO: 10 FL (ref 9.2–12.9)
POTASSIUM SERPL-SCNC: 3.9 MMOL/L (ref 3.5–5.1)
PROT SERPL-MCNC: 7.2 G/DL (ref 6–8.4)
RBC # BLD AUTO: 3.94 M/UL (ref 4–5.4)
SATURATED IRON: 13 % (ref 20–50)
SODIUM SERPL-SCNC: 140 MMOL/L (ref 136–145)
TOTAL IRON BINDING CAPACITY: 447 UG/DL (ref 250–450)
TRANSFERRIN SERPL-MCNC: 302 MG/DL (ref 200–375)
TRIGL SERPL-MCNC: 54 MG/DL (ref 30–150)
TSH SERPL DL<=0.005 MIU/L-ACNC: 1.18 UIU/ML (ref 0.4–4)
WBC # BLD AUTO: 5.77 K/UL (ref 3.9–12.7)

## 2024-01-10 PROCEDURE — 3075F SYST BP GE 130 - 139MM HG: CPT | Mod: CPTII,S$GLB,, | Performed by: INTERNAL MEDICINE

## 2024-01-10 PROCEDURE — 83540 ASSAY OF IRON: CPT | Performed by: INTERNAL MEDICINE

## 2024-01-10 PROCEDURE — 1159F MED LIST DOCD IN RCRD: CPT | Mod: CPTII,S$GLB,, | Performed by: INTERNAL MEDICINE

## 2024-01-10 PROCEDURE — 99395 PREV VISIT EST AGE 18-39: CPT | Mod: S$GLB,,, | Performed by: INTERNAL MEDICINE

## 2024-01-10 PROCEDURE — 84443 ASSAY THYROID STIM HORMONE: CPT | Performed by: INTERNAL MEDICINE

## 2024-01-10 PROCEDURE — 36415 COLL VENOUS BLD VENIPUNCTURE: CPT | Performed by: INTERNAL MEDICINE

## 2024-01-10 PROCEDURE — 99999 PR PBB SHADOW E&M-EST. PATIENT-LVL III: CPT | Mod: PBBFAC,,, | Performed by: INTERNAL MEDICINE

## 2024-01-10 PROCEDURE — 85025 COMPLETE CBC W/AUTO DIFF WBC: CPT | Performed by: INTERNAL MEDICINE

## 2024-01-10 PROCEDURE — 80061 LIPID PANEL: CPT | Performed by: INTERNAL MEDICINE

## 2024-01-10 PROCEDURE — 80053 COMPREHEN METABOLIC PANEL: CPT | Performed by: INTERNAL MEDICINE

## 2024-01-10 PROCEDURE — 3008F BODY MASS INDEX DOCD: CPT | Mod: CPTII,S$GLB,, | Performed by: INTERNAL MEDICINE

## 2024-01-10 PROCEDURE — 83036 HEMOGLOBIN GLYCOSYLATED A1C: CPT | Performed by: INTERNAL MEDICINE

## 2024-01-10 PROCEDURE — 3079F DIAST BP 80-89 MM HG: CPT | Mod: CPTII,S$GLB,, | Performed by: INTERNAL MEDICINE

## 2024-01-10 PROCEDURE — 82728 ASSAY OF FERRITIN: CPT | Performed by: INTERNAL MEDICINE

## 2024-01-10 NOTE — PROGRESS NOTES
[facility] Note    Chief Complaint      Chief Complaint   Patient presents with    Annual Exam     History of Present Illness      Gia Sawant is a 33 y.o. female who presents today for Annual preventative visit.  Patient comes to appointment alone.  GYN: Jana    Problem List Items Addressed This Visit       Class 3 severe obesity due to excess calories without serious comorbidity with body mass index (BMI) of 40.0 to 44.9 in adult    Current Assessment & Plan     Has not been walking or doing Pilates. Diet is good when she is exercising.         Relevant Orders    Hemoglobin A1C    TSH    RESOLVED: Class 2 obesity due to excess calories without serious comorbidity with body mass index (BMI) of 39.0 to 39.9 in adult    Current Assessment & Plan     Does Pilates 2 times per week.  Walks 3-5 miles in the park multiple times per week. Diet is good when she is exercising.          Other Visit Diagnoses       Annual physical exam    -  Primary    Relevant Orders    CBC Auto Differential    Lipid Panel    Comprehensive Metabolic Panel    Iron deficiency        Relevant Orders    Iron and TIBC    Ferritin            Health Maintenance   Topic Date Due    TETANUS VACCINE  03/08/2015    Hepatitis C Screening  Completed    Lipid Panel  Completed       History reviewed. No pertinent past medical history.    History reviewed. No pertinent surgical history.    family history includes Arthritis in her father; Breast cancer in an other family member; Cancer in her mother and another family member; Colon cancer in her paternal aunt and another family member; Diabetes in her father, mother, sister, and sister; Heart disease in her father, maternal grandfather, and maternal grandmother; Heart failure in her paternal grandfather; Hyperlipidemia in her father and mother; Hypertension in her father, mother, sister, and sister; Lung cancer in an other family member; Miscarriages / Stillbirths in her mother and  "sister; Other in her mother; Stomach cancer (age of onset: 63) in her mother; Stroke in her father.    Social History     Tobacco Use    Smoking status: Never    Smokeless tobacco: Never   Substance Use Topics    Alcohol use: Yes     Comment: Special occasions (holidays, birthdays)    Drug use: Not Currently     Types: Marijuana     Comment: While on vacation       Review of Systems   Constitutional:  Negative for chills and fever.   Respiratory:  Negative for cough and shortness of breath.    Cardiovascular:  Negative for chest pain and palpitations.   Gastrointestinal:  Negative for constipation, diarrhea, nausea and vomiting.   Genitourinary:  Negative for dysuria and hematuria.   Musculoskeletal:  Negative for falls.   Neurological:  Negative for headaches.        Outpatient Encounter Medications as of 1/10/2024   Medication Sig Dispense Refill    biotin 10,000 mcg Chew Take 1 capsule by mouth once daily.      cinnamon bark 500 mg capsule Take 1 capsule by mouth once daily.      Lacto-Bif-Sac-Bacil-Strep-bact (MVW COMPLETE FORMUL PROBIOTIC) 40 billion cell -15 mg CpDR Take 1 capsule by mouth once daily.      omega-3 fatty acids fish oil 1,050-1,200 mg Cap capsule Take 1 capsule by mouth once daily.      safflower oil-linoleic acid,co (CLA) 1,000 mg Cap Take 1,000 mg by mouth once daily.       No facility-administered encounter medications on file as of 1/10/2024.        Review of patient's allergies indicates:  No Known Allergies    Physical Exam      Vital Signs  Pulse: 86  SpO2: 96 %  BP: 136/84  Pain Score: 0-No pain  Height and Weight  Height: 5' 10" (177.8 cm)  Weight: 130.8 kg (288 lb 5.8 oz)  BSA (Calculated - sq m): 2.54 sq meters  BMI (Calculated): 41.4  Weight in (lb) to have BMI = 25: 173.9]    Physical Exam  Constitutional:       Appearance: She is well-developed.   HENT:      Head: Normocephalic and atraumatic.   Cardiovascular:      Rate and Rhythm: Normal rate and regular rhythm.      Heart " "sounds: Normal heart sounds. No murmur heard.  Pulmonary:      Effort: Pulmonary effort is normal. No respiratory distress.      Breath sounds: Normal breath sounds.   Abdominal:      General: There is no distension.      Palpations: Abdomen is soft.      Tenderness: There is no abdominal tenderness. There is no guarding.   Skin:     General: Skin is warm and dry.   Neurological:      Mental Status: She is alert. Mental status is at baseline.   Psychiatric:         Behavior: Behavior normal.          Laboratory:  CBC:  No results for input(s): "WBC", "RBC", "HGB", "HCT", "PLT", "MCV", "MCH", "MCHC" in the last 2160 hours.  CMP:  No results for input(s): "GLU", "CALCIUM", "ALBUMIN", "PROT", "NA", "K", "CO2", "CL", "BUN", "ALKPHOS", "ALT", "AST", "BILITOT" in the last 2160 hours.    Invalid input(s): "CREATININ"  URINALYSIS:  No results for input(s): "COLORU", "CLARITYU", "SPECGRAV", "PHUR", "PROTEINUA", "GLUCOSEU", "BILIRUBINCON", "BLOODU", "WBCU", "RBCU", "BACTERIA", "MUCUS", "NITRITE", "LEUKOCYTESUR", "UROBILINOGEN", "HYALINECASTS" in the last 2160 hours.   LIPIDS:  No results for input(s): "TSH", "HDL", "CHOL", "TRIG", "LDLCALC", "CHOLHDL", "NONHDLCHOL", "TOTALCHOLEST" in the last 2160 hours.  TSH:  No results for input(s): "TSH" in the last 2160 hours.  A1C:  No results for input(s): "HGBA1C" in the last 2160 hours.    Radiology:  No results found in the last 30 days.     Assessment/Plan     Gia Sawant is a 33 y.o.female with:    1. Annual physical exam  - CBC Auto Differential; Future  - Lipid Panel; Future  - Comprehensive Metabolic Panel; Future    2. Class 2 obesity due to excess calories without serious comorbidity with body mass index (BMI) of 39.0 to 39.9 in adult    3. Class 3 severe obesity due to excess calories without serious comorbidity with body mass index (BMI) of 40.0 to 44.9 in adult  - Hemoglobin A1C; Future  - TSH; Future    4. Iron deficiency  - Iron and TIBC; Future  - Ferritin; " Future    -check labs  -counseled on increasing exercise and working on diet  -Continue current medications and maintain follow up with specialists.    -Follow up in about 1 year (around 1/10/2025) for Annual Visit.       Milla Greenberg MD  [facility]

## 2024-06-26 ENCOUNTER — OFFICE VISIT (OUTPATIENT)
Dept: OBSTETRICS AND GYNECOLOGY | Facility: CLINIC | Age: 34
End: 2024-06-26
Payer: COMMERCIAL

## 2024-06-26 ENCOUNTER — LAB VISIT (OUTPATIENT)
Dept: LAB | Facility: HOSPITAL | Age: 34
End: 2024-06-26
Attending: STUDENT IN AN ORGANIZED HEALTH CARE EDUCATION/TRAINING PROGRAM
Payer: COMMERCIAL

## 2024-06-26 VITALS
HEART RATE: 90 BPM | DIASTOLIC BLOOD PRESSURE: 88 MMHG | BODY MASS INDEX: 39.14 KG/M2 | WEIGHT: 273.38 LBS | SYSTOLIC BLOOD PRESSURE: 143 MMHG | HEIGHT: 70 IN

## 2024-06-26 DIAGNOSIS — Z01.419 ENCOUNTER FOR GYNECOLOGICAL EXAMINATION: Primary | ICD-10-CM

## 2024-06-26 DIAGNOSIS — Z11.3 SCREENING FOR STDS (SEXUALLY TRANSMITTED DISEASES): ICD-10-CM

## 2024-06-26 DIAGNOSIS — Z01.419 ENCOUNTER FOR WELL WOMAN EXAM WITH ROUTINE GYNECOLOGICAL EXAM: ICD-10-CM

## 2024-06-26 LAB
ALBUMIN SERPL BCP-MCNC: 3.9 G/DL (ref 3.5–5.2)
ALP SERPL-CCNC: 41 U/L (ref 55–135)
ALT SERPL W/O P-5'-P-CCNC: 15 U/L (ref 10–44)
ANION GAP SERPL CALC-SCNC: 11 MMOL/L (ref 8–16)
AST SERPL-CCNC: 18 U/L (ref 10–40)
BASOPHILS # BLD AUTO: 0.04 K/UL (ref 0–0.2)
BASOPHILS NFR BLD: 0.6 % (ref 0–1.9)
BILIRUB SERPL-MCNC: 0.2 MG/DL (ref 0.1–1)
BUN SERPL-MCNC: 9 MG/DL (ref 6–20)
CALCIUM SERPL-MCNC: 10 MG/DL (ref 8.7–10.5)
CHLORIDE SERPL-SCNC: 105 MMOL/L (ref 95–110)
CO2 SERPL-SCNC: 22 MMOL/L (ref 23–29)
CREAT SERPL-MCNC: 0.8 MG/DL (ref 0.5–1.4)
DIFFERENTIAL METHOD BLD: NORMAL
EOSINOPHIL # BLD AUTO: 0.2 K/UL (ref 0–0.5)
EOSINOPHIL NFR BLD: 2.7 % (ref 0–8)
ERYTHROCYTE [DISTWIDTH] IN BLOOD BY AUTOMATED COUNT: 13.1 % (ref 11.5–14.5)
EST. GFR  (NO RACE VARIABLE): >60 ML/MIN/1.73 M^2
ESTIMATED AVG GLUCOSE: 105 MG/DL (ref 68–131)
GLUCOSE SERPL-MCNC: 79 MG/DL (ref 70–110)
HBA1C MFR BLD: 5.3 % (ref 4–5.6)
HBV SURFACE AG SERPL QL IA: NORMAL
HCT VFR BLD AUTO: 37 % (ref 37–48.5)
HGB BLD-MCNC: 12.3 G/DL (ref 12–16)
HIV 1+2 AB+HIV1 P24 AG SERPL QL IA: NORMAL
IMM GRANULOCYTES # BLD AUTO: 0.01 K/UL (ref 0–0.04)
IMM GRANULOCYTES NFR BLD AUTO: 0.2 % (ref 0–0.5)
IRON SERPL-MCNC: 84 UG/DL (ref 30–160)
LYMPHOCYTES # BLD AUTO: 2.8 K/UL (ref 1–4.8)
LYMPHOCYTES NFR BLD: 42.1 % (ref 18–48)
MCH RBC QN AUTO: 29.8 PG (ref 27–31)
MCHC RBC AUTO-ENTMCNC: 33.2 G/DL (ref 32–36)
MCV RBC AUTO: 90 FL (ref 82–98)
MONOCYTES # BLD AUTO: 0.5 K/UL (ref 0.3–1)
MONOCYTES NFR BLD: 8.1 % (ref 4–15)
NEUTROPHILS # BLD AUTO: 3.1 K/UL (ref 1.8–7.7)
NEUTROPHILS NFR BLD: 46.3 % (ref 38–73)
NRBC BLD-RTO: 0 /100 WBC
PLATELET # BLD AUTO: 327 K/UL (ref 150–450)
PMV BLD AUTO: 10.1 FL (ref 9.2–12.9)
POTASSIUM SERPL-SCNC: 3.9 MMOL/L (ref 3.5–5.1)
PROT SERPL-MCNC: 7.6 G/DL (ref 6–8.4)
RBC # BLD AUTO: 4.13 M/UL (ref 4–5.4)
SODIUM SERPL-SCNC: 138 MMOL/L (ref 136–145)
TREPONEMA PALLIDUM IGG+IGM AB [PRESENCE] IN SERUM OR PLASMA BY IMMUNOASSAY: NONREACTIVE
TSH SERPL DL<=0.005 MIU/L-ACNC: 1.19 UIU/ML (ref 0.4–4)
WBC # BLD AUTO: 6.63 K/UL (ref 3.9–12.7)

## 2024-06-26 PROCEDURE — 1159F MED LIST DOCD IN RCRD: CPT | Mod: CPTII,S$GLB,, | Performed by: STUDENT IN AN ORGANIZED HEALTH CARE EDUCATION/TRAINING PROGRAM

## 2024-06-26 PROCEDURE — 86593 SYPHILIS TEST NON-TREP QUANT: CPT | Performed by: STUDENT IN AN ORGANIZED HEALTH CARE EDUCATION/TRAINING PROGRAM

## 2024-06-26 PROCEDURE — 83540 ASSAY OF IRON: CPT | Performed by: STUDENT IN AN ORGANIZED HEALTH CARE EDUCATION/TRAINING PROGRAM

## 2024-06-26 PROCEDURE — 87491 CHLMYD TRACH DNA AMP PROBE: CPT | Performed by: STUDENT IN AN ORGANIZED HEALTH CARE EDUCATION/TRAINING PROGRAM

## 2024-06-26 PROCEDURE — 84443 ASSAY THYROID STIM HORMONE: CPT | Performed by: STUDENT IN AN ORGANIZED HEALTH CARE EDUCATION/TRAINING PROGRAM

## 2024-06-26 PROCEDURE — 87340 HEPATITIS B SURFACE AG IA: CPT | Performed by: STUDENT IN AN ORGANIZED HEALTH CARE EDUCATION/TRAINING PROGRAM

## 2024-06-26 PROCEDURE — 80053 COMPREHEN METABOLIC PANEL: CPT | Performed by: STUDENT IN AN ORGANIZED HEALTH CARE EDUCATION/TRAINING PROGRAM

## 2024-06-26 PROCEDURE — 3044F HG A1C LEVEL LT 7.0%: CPT | Mod: CPTII,S$GLB,, | Performed by: STUDENT IN AN ORGANIZED HEALTH CARE EDUCATION/TRAINING PROGRAM

## 2024-06-26 PROCEDURE — 3077F SYST BP >= 140 MM HG: CPT | Mod: CPTII,S$GLB,, | Performed by: STUDENT IN AN ORGANIZED HEALTH CARE EDUCATION/TRAINING PROGRAM

## 2024-06-26 PROCEDURE — 3008F BODY MASS INDEX DOCD: CPT | Mod: CPTII,S$GLB,, | Performed by: STUDENT IN AN ORGANIZED HEALTH CARE EDUCATION/TRAINING PROGRAM

## 2024-06-26 PROCEDURE — 83036 HEMOGLOBIN GLYCOSYLATED A1C: CPT | Performed by: STUDENT IN AN ORGANIZED HEALTH CARE EDUCATION/TRAINING PROGRAM

## 2024-06-26 PROCEDURE — 85025 COMPLETE CBC W/AUTO DIFF WBC: CPT | Performed by: STUDENT IN AN ORGANIZED HEALTH CARE EDUCATION/TRAINING PROGRAM

## 2024-06-26 PROCEDURE — 99999 PR PBB SHADOW E&M-EST. PATIENT-LVL III: CPT | Mod: PBBFAC,,, | Performed by: STUDENT IN AN ORGANIZED HEALTH CARE EDUCATION/TRAINING PROGRAM

## 2024-06-26 PROCEDURE — 87389 HIV-1 AG W/HIV-1&-2 AB AG IA: CPT | Performed by: STUDENT IN AN ORGANIZED HEALTH CARE EDUCATION/TRAINING PROGRAM

## 2024-06-26 PROCEDURE — 36415 COLL VENOUS BLD VENIPUNCTURE: CPT | Performed by: STUDENT IN AN ORGANIZED HEALTH CARE EDUCATION/TRAINING PROGRAM

## 2024-06-26 PROCEDURE — 3079F DIAST BP 80-89 MM HG: CPT | Mod: CPTII,S$GLB,, | Performed by: STUDENT IN AN ORGANIZED HEALTH CARE EDUCATION/TRAINING PROGRAM

## 2024-06-26 PROCEDURE — 1160F RVW MEDS BY RX/DR IN RCRD: CPT | Mod: CPTII,S$GLB,, | Performed by: STUDENT IN AN ORGANIZED HEALTH CARE EDUCATION/TRAINING PROGRAM

## 2024-06-26 PROCEDURE — 99395 PREV VISIT EST AGE 18-39: CPT | Mod: S$GLB,,, | Performed by: STUDENT IN AN ORGANIZED HEALTH CARE EDUCATION/TRAINING PROGRAM

## 2024-06-26 NOTE — PROGRESS NOTES
Chief Complaint: Well Woman Exam     HPI:      Gia Sawant is a 33 y.o. who presents today for well woman exam.  LMP: Patient's last menstrual period was 06/10/2024 (exact date).    Today patient's GYN complaints include: none.  Specifically, patient denies abnormal vaginal bleeding, discharge, pelvic pain, urinary problems.    Ms. Sawant is currently sexually active with a single male partner. She is currently using condoms for contraception. She would like STD screening today.    Previous Pap: NILM, HPV negative (6/7/2023)    History reviewed. No pertinent past medical history.    History reviewed. No pertinent surgical history.    Social History     Socioeconomic History    Marital status: Single   Tobacco Use    Smoking status: Never    Smokeless tobacco: Never   Substance and Sexual Activity    Alcohol use: Yes     Comment: Special occasions (holidays, birthdays)    Drug use: Not Currently     Types: Marijuana     Comment: While on vacation    Sexual activity: Yes     Partners: Male     Birth control/protection: Coitus interruptus, Condom       Family History   Problem Relation Name Age of Onset    Other Mother BAD         pituitary tumor (benign?); benign breast tumors    Diabetes Mother BAD     Hyperlipidemia Mother BAD     Hypertension Mother BAD     Miscarriages / Stillbirths Mother BAD     Stomach cancer Mother BAD 63        de mani stage IV    Cancer Mother BAD     Seizures Mother BAD     Arthritis Father CD     Diabetes Father CD     Heart disease Father CD     Hyperlipidemia Father CD     Hypertension Father CD     Stroke Father CD     Diabetes Sister [facility]     Hypertension Sister [facility]     Diabetes Sister IVAN     Hypertension Sister IVAN     Miscarriages / Stillbirths Sister IVAN     Colon cancer Paternal Aunt Karena         surv'd a few yrs    Heart disease Maternal Grandfather [facility]     Heart disease Maternal Grandmother [facility]     Breast cancer Other      Cancer Other          type?    Lung cancer Other    "   Heart failure Paternal Grandfather CD     Colon cancer Other         Review of patient's allergies indicates:  No Known Allergies    OB History    No obstetric history on file.         Physical Exam:      PHYSICAL EXAM:  BP (!) 143/88   Pulse 90   Ht 5' 10" (1.778 m)   Wt 124 kg (273 lb 5.9 oz)   LMP 06/10/2024 (Exact Date)   BMI 39.22 kg/m²   Body mass index is 39.22 kg/m².     APPEARANCE: Well nourished, well developed, in no acute distress.  PSYCH: Appropriate mood and affect.  SKIN: No acne or hirsutism  NECK: Neck symmetric without masses  NODES: No inguinal, axillary, or supraclavicular lymph node enlargement  ABDOMEN: Soft.  No tenderness or masses.    CARDIOVASCULAR: No edema of peripheral extremities  BREASTS: Symmetrical, no visible skin lesions. No palpable masses. No nipple discharge bilaterally.  PELVIC: Normal external genitalia without lesions.  Normal hair distribution.  Adequate perineal body, normal urethral meatus.  Vagina moist and well rugated. Without lesions. Without discharge.  Cervix pink, without lesions, discharge or tenderness.  No significant cystocele or rectocele.  Bimanual exam shows uterus to be normal size, regular, mobile and nontender.  Adnexa without masses or tenderness.      Assessment/Plan:     Encounter for gynecological examination    Screening for STDs (sexually transmitted diseases)  -     C. trachomatis/N. gonorrhoeae by AMP DNA  -     HIV 1/2 Ag/Ab (4th Gen); Future; Expected date: 06/26/2024  -     Treponema Pallidium Antibodies IgG, IgM; Future; Expected date: 06/26/2024  -     HEPATITIS B SURFACE ANTIGEN; Future; Expected date: 06/26/2024    Encounter for well woman exam with routine gynecological exam  -     CBC W/ AUTO DIFFERENTIAL; Future; Expected date: 06/26/2024  -     COMPREHENSIVE METABOLIC PANEL; Future; Expected date: 06/26/2024  -     HEMOGLOBIN A1C; Future; Expected date: 06/26/2024  -     IRON; Future; Expected date: 06/26/2024  -     TSH; Future; " Expected date: 06/26/2024      - pelvic exam normal  - pap/hpv due 6/2026  - gc/cz today per pt request  - routine labs and STI panel today  - RTC 1 yr or sooner PRN      Counseling:     Patient was counseled today on current ASCCP pap guidelines, the recommendation for yearly physical exams, safe driving habits, breast self awareness. She is to see her PCP for other health maintenance.     Use of the [facility] Patient Portal discussed and encouraged during today's visit.

## 2024-06-27 LAB
C TRACH DNA SPEC QL NAA+PROBE: NOT DETECTED
N GONORRHOEA DNA SPEC QL NAA+PROBE: NOT DETECTED

## 2024-09-05 ENCOUNTER — HOSPITAL ENCOUNTER (EMERGENCY)
Facility: HOSPITAL | Age: 34
Discharge: HOME | End: 2024-09-06
Attending: STUDENT IN AN ORGANIZED HEALTH CARE EDUCATION/TRAINING PROGRAM
Payer: COMMERCIAL

## 2024-09-05 VITALS
WEIGHT: 273 LBS | TEMPERATURE: 98 F | OXYGEN SATURATION: 96 % | RESPIRATION RATE: 16 BRPM | SYSTOLIC BLOOD PRESSURE: 127 MMHG | DIASTOLIC BLOOD PRESSURE: 84 MMHG | HEART RATE: 69 BPM | BODY MASS INDEX: 39.17 KG/M2

## 2024-09-05 DIAGNOSIS — J01.00 ACUTE MAXILLARY SINUSITIS, RECURRENCE NOT SPECIFIED: ICD-10-CM

## 2024-09-05 DIAGNOSIS — R20.0 NUMBNESS: Primary | ICD-10-CM

## 2024-09-05 LAB
ALBUMIN SERPL BCP-MCNC: 4 G/DL (ref 3.5–5.2)
ALP SERPL-CCNC: 43 U/L (ref 55–135)
ALT SERPL W/O P-5'-P-CCNC: 23 U/L (ref 10–44)
ANION GAP SERPL CALC-SCNC: 9 MMOL/L (ref 8–16)
AST SERPL-CCNC: 37 U/L (ref 10–40)
B-HCG UR QL: NEGATIVE
BASOPHILS # BLD AUTO: 0.06 K/UL (ref 0–0.2)
BASOPHILS NFR BLD: 0.9 % (ref 0–1.9)
BILIRUB SERPL-MCNC: 0.2 MG/DL (ref 0.1–1)
BUN SERPL-MCNC: 16 MG/DL (ref 6–20)
BUN SERPL-MCNC: 17 MG/DL (ref 6–30)
CALCIUM SERPL-MCNC: 10 MG/DL (ref 8.7–10.5)
CHLORIDE SERPL-SCNC: 102 MMOL/L (ref 95–110)
CHLORIDE SERPL-SCNC: 103 MMOL/L (ref 95–110)
CO2 SERPL-SCNC: 25 MMOL/L (ref 23–29)
CREAT SERPL-MCNC: 0.8 MG/DL (ref 0.5–1.4)
CREAT SERPL-MCNC: 0.9 MG/DL (ref 0.5–1.4)
CTP QC/QA: YES
DIFFERENTIAL METHOD BLD: ABNORMAL
EOSINOPHIL # BLD AUTO: 0.2 K/UL (ref 0–0.5)
EOSINOPHIL NFR BLD: 2.5 % (ref 0–8)
ERYTHROCYTE [DISTWIDTH] IN BLOOD BY AUTOMATED COUNT: 13.7 % (ref 11.5–14.5)
EST. GFR  (NO RACE VARIABLE): >60 ML/MIN/1.73 M^2
GLUCOSE SERPL-MCNC: 89 MG/DL (ref 70–110)
GLUCOSE SERPL-MCNC: 90 MG/DL (ref 70–110)
HCT VFR BLD AUTO: 36.3 % (ref 37–48.5)
HCT VFR BLD CALC: 36 %PCV (ref 36–54)
HCV AB SERPL QL IA: NORMAL
HGB BLD-MCNC: 11.9 G/DL (ref 12–16)
IMM GRANULOCYTES # BLD AUTO: 0.01 K/UL (ref 0–0.04)
IMM GRANULOCYTES NFR BLD AUTO: 0.1 % (ref 0–0.5)
LYMPHOCYTES # BLD AUTO: 3.1 K/UL (ref 1–4.8)
LYMPHOCYTES NFR BLD: 45.4 % (ref 18–48)
MCH RBC QN AUTO: 29 PG (ref 27–31)
MCHC RBC AUTO-ENTMCNC: 32.8 G/DL (ref 32–36)
MCV RBC AUTO: 89 FL (ref 82–98)
MONOCYTES # BLD AUTO: 0.4 K/UL (ref 0.3–1)
MONOCYTES NFR BLD: 6.1 % (ref 4–15)
NEUTROPHILS # BLD AUTO: 3 K/UL (ref 1.8–7.7)
NEUTROPHILS NFR BLD: 45 % (ref 38–73)
NRBC BLD-RTO: 0 /100 WBC
PLATELET # BLD AUTO: 319 K/UL (ref 150–450)
PMV BLD AUTO: 9.7 FL (ref 9.2–12.9)
POC IONIZED CALCIUM: 1.1 MMOL/L (ref 1.06–1.42)
POC TCO2 (MEASURED): 26 MMOL/L (ref 23–29)
POTASSIUM BLD-SCNC: 3.4 MMOL/L (ref 3.5–5.1)
POTASSIUM SERPL-SCNC: 3.5 MMOL/L (ref 3.5–5.1)
PROT SERPL-MCNC: 7.6 G/DL (ref 6–8.4)
RBC # BLD AUTO: 4.1 M/UL (ref 4–5.4)
SAMPLE: ABNORMAL
SODIUM BLD-SCNC: 138 MMOL/L (ref 136–145)
SODIUM SERPL-SCNC: 137 MMOL/L (ref 136–145)
WBC # BLD AUTO: 6.74 K/UL (ref 3.9–12.7)

## 2024-09-05 PROCEDURE — 99284 EMERGENCY DEPT VISIT MOD MDM: CPT | Mod: 25

## 2024-09-05 PROCEDURE — 86803 HEPATITIS C AB TEST: CPT | Performed by: PHYSICIAN ASSISTANT

## 2024-09-05 PROCEDURE — 85025 COMPLETE CBC W/AUTO DIFF WBC: CPT

## 2024-09-05 PROCEDURE — 81025 URINE PREGNANCY TEST: CPT

## 2024-09-05 PROCEDURE — 80053 COMPREHEN METABOLIC PANEL: CPT

## 2024-09-05 RX ORDER — AMOXICILLIN AND CLAVULANATE POTASSIUM 875; 125 MG/1; MG/1
1 TABLET, FILM COATED ORAL 2 TIMES DAILY
Qty: 14 TABLET | Refills: 0 | Status: SHIPPED | OUTPATIENT
Start: 2024-09-05 | End: 2024-09-12

## 2024-09-06 NOTE — ED PROVIDER NOTES
Encounter Date: 9/5/2024       History     Chief Complaint   Patient presents with    Numbness     Endorses left sided facial numbness since Saturday. Denies any weakness, facial droop     34-year-old female with no significant past medical history presents with a chief complaint of left-sided facial numbness.  She says that this started on Saturday and has been persistent.  She says that the numbness has on the left side of her nose and also on her upper lip.  She is denying any weakness, facial asymmetry, headache, fevers, any other numbness or weakness throughout her body, chest pain, shortness of breath, abdominal pain, nausea, vomiting, diarrhea or rash.    The history is provided by the patient. No  was used.     Review of patient's allergies indicates:  No Known Allergies  History reviewed. No pertinent past medical history.  History reviewed. No pertinent surgical history.  Family History   Problem Relation Name Age of Onset    Other Mother BAD         pituitary tumor (benign?); benign breast tumors    Diabetes Mother BAD     Hyperlipidemia Mother BAD     Hypertension Mother BAD     Miscarriages / Stillbirths Mother BAD     Stomach cancer Mother BAD 63        de mani stage IV    Cancer Mother BAD     Seizures Mother BAD     Arthritis Father CD     Diabetes Father CD     Heart disease Father CD     Hyperlipidemia Father CD     Hypertension Father CD     Stroke Father CD     Diabetes Sister [facility]     Hypertension Sister [facility]     Diabetes Sister IVAN     Hypertension Sister IVAN     Miscarriages / Stillbirths Sister IVAN     Colon cancer Paternal Aunt Karena         surv'd a few yrs    Heart disease Maternal Grandfather [facility]     Heart disease Maternal Grandmother [facility]     Breast cancer Other      Cancer Other          type?    Lung cancer Other      Heart failure Paternal Grandfather CD     Colon cancer Other       Social History     Tobacco Use    Smoking status: Never    Smokeless tobacco: Never    Substance Use Topics    Alcohol use: Yes     Comment: Special occasions (holidays, birthdays)    Drug use: Not Currently     Types: Marijuana     Comment: While on vacation     Review of Systems    Physical Exam     Initial Vitals [09/05/24 1907]   BP Pulse Resp Temp SpO2   (!) 153/98 80 18 98.1 °F (36.7 °C) 100 %      MAP       --         Physical Exam    Nursing note and vitals reviewed.  Constitutional: She appears well-developed and well-nourished. She is not diaphoretic. No distress.   HENT:   Head: Normocephalic and atraumatic.   Right Ear: External ear normal.   Left Ear: External ear normal.   Nose: Nose normal.   Mouth/Throat: Oropharynx is clear and moist. No oropharyngeal exudate.   She has no focal tenderness over the maxilla bilaterally   Eyes: EOM are normal. Pupils are equal, round, and reactive to light. Right eye exhibits no discharge. Left eye exhibits no discharge.   Neck: Neck supple.   Cardiovascular:  Normal rate, regular rhythm, normal heart sounds and intact distal pulses.           Pulmonary/Chest: Breath sounds normal. She has no wheezes. She has no rhonchi. She has no rales.   Musculoskeletal:      Cervical back: Neck supple.     Neurological: She is alert and oriented to person, place, and time. No cranial nerve deficit.   Cranial nerves 2-12 grossly intact, patient has intact sensation in the face, but notes that she just has paresthesias in the left upper lip and left side of the nose, she was no pronator drift, power and sensation are equal and preserved in the upper and lower extremities bilaterally   Skin: Skin is warm and dry. Capillary refill takes less than 2 seconds. No rash noted. No erythema. No pallor.   Psychiatric: She has a normal mood and affect. Her behavior is normal. Judgment and thought content normal.         ED Course   Procedures  Labs Reviewed   CBC W/ AUTO DIFFERENTIAL - Abnormal       Result Value    WBC 6.74      RBC 4.10      Hemoglobin 11.9 (*)      Hematocrit 36.3 (*)     MCV 89      MCH 29.0      MCHC 32.8      RDW 13.7      Platelets 319      MPV 9.7      Immature Granulocytes 0.1      Gran # (ANC) 3.0      Immature Grans (Abs) 0.01      Lymph # 3.1      Mono # 0.4      Eos # 0.2      Baso # 0.06      nRBC 0      Gran % 45.0      Lymph % 45.4      Mono % 6.1      Eosinophil % 2.5      Basophil % 0.9      Differential Method Automated     COMPREHENSIVE METABOLIC PANEL - Abnormal    Sodium 137      Potassium 3.5      Chloride 103      CO2 25      Glucose 89      BUN 16      Creatinine 0.8      Calcium 10.0      Total Protein 7.6      Albumin 4.0      Total Bilirubin 0.2      Alkaline Phosphatase 43 (*)     AST 37      ALT 23      eGFR >60.0      Anion Gap 9     ISTAT PROCEDURE - Abnormal    POC Glucose 90      POC BUN 17      POC Creatinine 0.9      POC Sodium 138      POC Potassium 3.4 (*)     POC Chloride 102      POC TCO2 (MEASURED) 26      POC Ionized Calcium 1.10      POC Hematocrit 36      Sample GUNNER     HEPATITIS C ANTIBODY    Hepatitis C Ab Non-reactive      Narrative:     Release to patient->Immediate   POCT URINE PREGNANCY    POC Preg Test, Ur Negative       Acceptable Yes     ISTAT CHEM8          Imaging Results              CT Maxillofacial Without Contrast (Final result)  Result time 09/05/24 22:59:22      Final result by Garrett Andujar MD (09/05/24 22:59:22)                   Impression:      1. No acute facial fractures.  2. Left maxillary sinus disease.      Electronically signed by: Garrett Andujar  Date:    09/05/2024  Time:    22:59               Narrative:    EXAMINATION:  CT MAXILLOFACIAL WITHOUT CONTRAST    CLINICAL HISTORY:  Maxillofacial pain;    TECHNIQUE:  Low dose axial images, sagittal and coronal reformations were obtained through the face.  Contrast was not administered.    COMPARISON:  None    FINDINGS:  No acute facial fractures are detected.    Nasal bones are intact.  Zygomatic arches are intact.  The orbits  are intact.  Nasal septum is midline.  The mandible is intact.  Moderate left maxillary sinus disease.                                       CT Head Without Contrast (Final result)  Result time 09/05/24 22:53:52      Final result by Garrett Andujar MD (09/05/24 22:53:52)                   Impression:      1. No acute intracranial process.  2. Left maxillary sinus disease.      Electronically signed by: Garrett Andujar  Date:    09/05/2024  Time:    22:53               Narrative:    EXAMINATION:  CT HEAD WITHOUT CONTRAST    CLINICAL HISTORY:  Headache, new or worsening, neuro deficit (Age 19-49y);    TECHNIQUE:  Low dose axial CT images obtained throughout the head without intravenous contrast. Sagittal and coronal reconstructions were performed.    COMPARISON:  None.    FINDINGS:  Intracranial compartment:    Ventricles and sulci are normal in size for age without evidence of hydrocephalus. No extra-axial blood or fluid collections.    The brain parenchyma appears normal. No parenchymal mass, hemorrhage, edema or major vascular distribution infarct.    Skull/extracranial contents (limited evaluation): No fracture. Moderate left maxillary sinus disease.                                       Medications - No data to display  Medical Decision Making  See ED course for remainder of care    Amount and/or Complexity of Data Reviewed  Labs: ordered.  Radiology: ordered. Decision-making details documented in ED Course.    Risk  Prescription drug management.               ED Course as of 09/06/24 0129   Fri Sep 06, 2024   0126 34-year-old female in no acute distress.  Patient was very well-appearing and nontoxic on exam.  Neurologic exam is nonfocal safe for paresthesias in the face.  Differential includes but is not limited to subacute CVA versus peripheral neuropathy, doubt cavernous sinus thrombosis, patient does not have any ophthalmoplegia or visual field deficits [BP]   0128 CT Head Without Contrast  No acute  intracranial process [BP]   0128 CT Maxillofacial Without Contrast  Patient has moderate left maxillary sinus disease.  I explained to the patient that we did not find an emergent cause for the numbness on her face.  We will discharge with antibiotic for sinusitis and I recommended follow up with the patient's primary doctor if her symptoms persist after a few days.  I discussed return precautions with her and answered all questions.  I provided discharge paperwork and the patient was discharged in stable condition. [BP]      ED Course User Index  [BP] Kurt Prater MD                           Clinical Impression:  Final diagnoses:  [R20.0] Numbness (Primary)  [J01.00] Acute maxillary sinusitis, recurrence not specified          ED Disposition Condition    Discharge Stable          ED Prescriptions       Medication Sig Dispense Start Date End Date Auth. Provider    amoxicillin-clavulanate 875-125mg (AUGMENTIN) 875-125 mg per tablet Take 1 tablet by mouth 2 (two) times daily. for 7 days 14 tablet 9/5/2024 9/12/2024 Kurt Prater MD          Follow-up Information       Follow up With Specialties Details Why Contact Info    Milla Greenberg MD Internal Medicine  As needed, If symptoms worsen [address]  [address]  [facility] [address]  [phone]               Kurt Prater MD  Resident  09/06/24 0129       Kurt Prater MD  Resident  09/07/24 0124

## 2024-09-06 NOTE — ED TRIAGE NOTES
Gia Sawant, a 34 y.o. female presents to the ED w/ complaint of left face numbness. Pt stated that it is only from the left side of her nose to the top of her lip. Denies any other complaints     Triage note:  Chief Complaint   Patient presents with    Numbness     Endorses left sided facial numbness since Saturday. Denies any weakness, facial droop     Review of patient's allergies indicates:  No Known Allergies  No past medical history on file.    LOC: The patient is awake, alert, aware of environment with an appropriate affect. Oriented x4, speaking appropriately  APPEARANCE: Pt resting comfortably, in no acute distress, pt is clean and well groomed, clothing properly fastened  SKIN:The skin is warm and dry, color consistent with ethnicity, patient has normal skin turgor and moist mucus membranes, no bruising noted   RESPIRATORY:Airway is open and patent, respirations are spontaneous, patient has a normal effort and rate, no accessory muscle use noted.  CARDIAC: Normal rate and rhythm, no peripheral edema noted, capillary refill < 3 seconds, bilateral radial pulses 2+.  ABDOMEN: Soft, non tender, non distended. Bowel sounds present x 4 quadrants.   NEUROLOGIC: PERRLA, facial expression is symmetrical, patient moving all extremities spontaneously, normal sensation in all extremities when touched with a finger.  Follows all commands appropriately. Left face numbness   MUSCULOSKELETAL: Patient moving all extremities spontaneously, no obvious swelling or deformities noted.

## 2024-09-06 NOTE — ED NOTES
I-STAT Chem-8+ Results:   Value Reference Range   Sodium 138 136-145 mmol/L   Potassium  3.4 3.5-5.1 mmol/L   Chloride 102  mmol/L   Ionized Calcium 1.10 1.06-1.42 mmol/L   CO2 (measured) 26 23-29 mmol/L   Glucose 90  mg/dL   BUN 17 6-30 mg/dL   Creatinine 0.9 0.5-1.4 mg/dL   Hematocrit 36 36-54%

## 2024-09-06 NOTE — DISCHARGE INSTRUCTIONS
Diagnosis: Sinusitis    Tests today showed:   Labs Reviewed   CBC W/ AUTO DIFFERENTIAL - Abnormal       Result Value    WBC 6.74      RBC 4.10      Hemoglobin 11.9 (*)     Hematocrit 36.3 (*)     MCV 89      MCH 29.0      MCHC 32.8      RDW 13.7      Platelets 319      MPV 9.7      Immature Granulocytes 0.1      Gran # (ANC) 3.0      Immature Grans (Abs) 0.01      Lymph # 3.1      Mono # 0.4      Eos # 0.2      Baso # 0.06      nRBC 0      Gran % 45.0      Lymph % 45.4      Mono % 6.1      Eosinophil % 2.5      Basophil % 0.9      Differential Method Automated     COMPREHENSIVE METABOLIC PANEL - Abnormal    Sodium 137      Potassium 3.5      Chloride 103      CO2 25      Glucose 89      BUN 16      Creatinine 0.8      Calcium 10.0      Total Protein 7.6      Albumin 4.0      Total Bilirubin 0.2      Alkaline Phosphatase 43 (*)     AST 37      ALT 23      eGFR >60.0      Anion Gap 9     ISTAT PROCEDURE - Abnormal    POC Glucose 90      POC BUN 17      POC Creatinine 0.9      POC Sodium 138      POC Potassium 3.4 (*)     POC Chloride 102      POC TCO2 (MEASURED) 26      POC Ionized Calcium 1.10      POC Hematocrit 36      Sample GUNNER     HEPATITIS C ANTIBODY    Hepatitis C Ab Non-reactive      Narrative:     Release to patient->Immediate   POCT URINE PREGNANCY    POC Preg Test, Ur Negative       Acceptable Yes     ISTAT CHEM8     CT Maxillofacial Without Contrast   Final Result      1. No acute facial fractures.   2. Left maxillary sinus disease.         Electronically signed by: Garrett Villaseñor   Date:    09/05/2024   Time:    22:59      CT Head Without Contrast   Final Result      1. No acute intracranial process.   2. Left maxillary sinus disease.         Electronically signed by: Garrett Villaseñor   Date:    09/05/2024   Time:    22:53          Treatments you had today:   Medications - No data to display    Follow-Up Plan:  - Follow-up with primary care doctor within 3 - 5 days  - Additional testing  and/or evaluation as directed by your primary doctor    Return to the Emergency Department for symptoms including but not limited to: worsening symptoms, shortness of breath or chest pain, vomiting with inability to hold down fluids, fevers greater than 100.4°F, passing out/fainting/unconsciousness, or other concerning symptoms.

## 2024-09-06 NOTE — FIRST PROVIDER EVALUATION
Emergency Department TeleTriage Encounter Note      CHIEF COMPLAINT    Chief Complaint   Patient presents with    Numbness     Endorses left sided facial numbness since Saturday. Denies any weakness, facial droop       VITAL SIGNS   Initial Vitals [09/05/24 1907]   BP Pulse Resp Temp SpO2   (!) 153/98 80 18 98.1 °F (36.7 °C) 100 %      MAP       --            ALLERGIES    Review of patient's allergies indicates:  No Known Allergies    PROVIDER TRIAGE NOTE  Patient presents with numbness in the left upper lip and left side of the nose since Sat. No focal weakness. Motor facial nerves are intact. No recent dental procedures or trauma. No numbness or focal weakness to the extremities.       ORDERS  Labs Reviewed   HEPATITIS C ANTIBODY       ED Orders (720h ago, onward)      Start Ordered     Status Ordering Provider    09/05/24 1909 09/05/24 1908  Hepatitis C Antibody  STAT         Ordered DARRYN WATSON              Virtual Visit Note: The provider triage portion of this emergency department evaluation and documentation was performed via [employer], a HIPAA-compliant telemedicine application, in concert with a tele-presenter in the room. A face to face patient evaluation with one of my colleagues will occur once the patient is placed in an emergency department room.      DISCLAIMER: This note was prepared with [email or web address] voice recognition transcription software. Garbled syntax, mangled pronouns, and other bizarre constructions may be attributed to that software system.

## 2024-09-12 ENCOUNTER — PATIENT MESSAGE (OUTPATIENT)
Dept: PRIMARY CARE CLINIC | Facility: CLINIC | Age: 34
End: 2024-09-12
Payer: COMMERCIAL

## 2024-09-13 ENCOUNTER — OFFICE VISIT (OUTPATIENT)
Dept: PRIMARY CARE CLINIC | Facility: CLINIC | Age: 34
End: 2024-09-13
Payer: COMMERCIAL

## 2024-09-13 DIAGNOSIS — J32.9 SINUSITIS, UNSPECIFIED CHRONICITY, UNSPECIFIED LOCATION: Primary | ICD-10-CM

## 2024-09-13 PROCEDURE — 99213 OFFICE O/P EST LOW 20 MIN: CPT | Mod: 95,,, | Performed by: STUDENT IN AN ORGANIZED HEALTH CARE EDUCATION/TRAINING PROGRAM

## 2024-09-13 PROCEDURE — 1159F MED LIST DOCD IN RCRD: CPT | Mod: CPTII,95,, | Performed by: STUDENT IN AN ORGANIZED HEALTH CARE EDUCATION/TRAINING PROGRAM

## 2024-09-13 PROCEDURE — 1160F RVW MEDS BY RX/DR IN RCRD: CPT | Mod: CPTII,95,, | Performed by: STUDENT IN AN ORGANIZED HEALTH CARE EDUCATION/TRAINING PROGRAM

## 2024-09-13 PROCEDURE — 3044F HG A1C LEVEL LT 7.0%: CPT | Mod: CPTII,95,, | Performed by: STUDENT IN AN ORGANIZED HEALTH CARE EDUCATION/TRAINING PROGRAM

## 2024-09-13 NOTE — PROGRESS NOTES
Subjective:       Patient ID: Gia Sawant is a 34 y.o. female.    Chief Complaint: ED follow up    The patient location is: [place], [place]  The chief complaint leading to consultation is: Sinusitis    Visit type: audiovisual    Face to Face time with patient: 10 minutes  15 minutes of total time spent on the encounter, which includes face to face time and non-face to face time preparing to see the patient (eg, review of tests), Obtaining and/or reviewing separately obtained history, Documenting clinical information in the electronic or other health record, Independently interpreting results (not separately reported) and communicating results to the patient/family/caregiver, or Care coordination (not separately reported).         Each patient to whom he or she provides medical services by telemedicine is:  (1) informed of the relationship between the physician and patient and the respective role of any other health care provider with respect to management of the patient; and (2) notified that he or she may decline to receive medical services by telemedicine and may withdraw from such care at any time.    Notes:     HPI    Went to the ED 9/5 for facial numbness/pressure.  CT head showed maxillary sinusitis, otherwise wnl at that time.   No fevers  Rx course of augmentin, has completed antibotics  Improvement in sxs since finishing coursea    Review of Systems   Constitutional:  Negative for activity change and unexpected weight change.   HENT:  Negative for hearing loss, rhinorrhea and trouble swallowing.    Eyes:  Negative for discharge and visual disturbance.   Respiratory:  Negative for chest tightness and wheezing.    Cardiovascular:  Negative for chest pain and palpitations.   Gastrointestinal:  Negative for blood in stool, constipation, diarrhea and vomiting.   Endocrine: Negative for polydipsia and polyuria.   Genitourinary:  Negative for difficulty urinating, dysuria, hematuria and menstrual problem.    Musculoskeletal:  Negative for arthralgias, joint swelling and neck pain.   Neurological:  Negative for weakness and headaches.   Psychiatric/Behavioral:  Negative for confusion and dysphoric mood.          Objective:      Physical Exam  HENT:      Head: Normocephalic and atraumatic.      Nose: Nose normal.      Mouth/Throat:      Mouth: Mucous membranes are moist.      Pharynx: Oropharynx is clear.   Eyes:      Extraocular Movements: Extraocular movements intact.      Conjunctiva/sclera: Conjunctivae normal.      Pupils: Pupils are equal, round, and reactive to light.   Pulmonary:      Effort: Pulmonary effort is normal.   Musculoskeletal:      Cervical back: Normal range of motion.   Skin:     Findings: No lesion or rash.   Neurological:      General: No focal deficit present.      Mental Status: She is alert and oriented to person, place, and time.      Motor: No weakness.   Psychiatric:         Mood and Affect: Mood normal.         Thought Content: Thought content normal.         Assessment:       Problem List Items Addressed This Visit    None  Visit Diagnoses       Sinusitis, unspecified chronicity, unspecified location    -  Primary            Plan:       Diagnoses and all orders for this visit:    Sinusitis, unspecified chronicity, unspecified location         Sxs improving  - discussed etiology of symptoms and treatment: recommend low dose intranasal steroids and Antihistamine, and nasal saline rinse. OTC cough suppressants and lozenges prn. Pt instructed to notify clinic if fever develops, or no improvement.

## 2024-11-27 ENCOUNTER — PATIENT MESSAGE (OUTPATIENT)
Dept: RESEARCH | Facility: HOSPITAL | Age: 34
End: 2024-11-27
Payer: COMMERCIAL

## 2025-02-26 ENCOUNTER — OFFICE VISIT (OUTPATIENT)
Dept: PRIMARY CARE CLINIC | Facility: CLINIC | Age: 35
End: 2025-02-26
Payer: COMMERCIAL

## 2025-02-26 ENCOUNTER — LAB VISIT (OUTPATIENT)
Dept: LAB | Facility: HOSPITAL | Age: 35
End: 2025-02-26
Attending: INTERNAL MEDICINE
Payer: COMMERCIAL

## 2025-02-26 VITALS
WEIGHT: 293 LBS | HEART RATE: 96 BPM | OXYGEN SATURATION: 98 % | DIASTOLIC BLOOD PRESSURE: 78 MMHG | HEIGHT: 70 IN | BODY MASS INDEX: 41.95 KG/M2 | SYSTOLIC BLOOD PRESSURE: 122 MMHG

## 2025-02-26 DIAGNOSIS — Z00.00 ANNUAL PHYSICAL EXAM: Primary | ICD-10-CM

## 2025-02-26 DIAGNOSIS — R63.5 WEIGHT GAIN: ICD-10-CM

## 2025-02-26 DIAGNOSIS — E66.01 CLASS 3 SEVERE OBESITY DUE TO EXCESS CALORIES WITHOUT SERIOUS COMORBIDITY WITH BODY MASS INDEX (BMI) OF 40.0 TO 44.9 IN ADULT: ICD-10-CM

## 2025-02-26 DIAGNOSIS — E61.1 IRON DEFICIENCY: ICD-10-CM

## 2025-02-26 DIAGNOSIS — E66.813 CLASS 3 SEVERE OBESITY DUE TO EXCESS CALORIES WITHOUT SERIOUS COMORBIDITY WITH BODY MASS INDEX (BMI) OF 40.0 TO 44.9 IN ADULT: ICD-10-CM

## 2025-02-26 DIAGNOSIS — E66.813 CLASS 3 SEVERE OBESITY DUE TO EXCESS CALORIES WITHOUT SERIOUS COMORBIDITY WITH BODY MASS INDEX (BMI) OF 45.0 TO 49.9 IN ADULT: ICD-10-CM

## 2025-02-26 DIAGNOSIS — E66.01 CLASS 3 SEVERE OBESITY DUE TO EXCESS CALORIES WITHOUT SERIOUS COMORBIDITY WITH BODY MASS INDEX (BMI) OF 45.0 TO 49.9 IN ADULT: ICD-10-CM

## 2025-02-26 DIAGNOSIS — Z00.00 ANNUAL PHYSICAL EXAM: ICD-10-CM

## 2025-02-26 LAB
ALBUMIN SERPL BCP-MCNC: 3.6 G/DL (ref 3.5–5.2)
ALP SERPL-CCNC: 45 U/L (ref 40–150)
ALT SERPL W/O P-5'-P-CCNC: 11 U/L (ref 10–44)
ANION GAP SERPL CALC-SCNC: 9 MMOL/L (ref 8–16)
AST SERPL-CCNC: 20 U/L (ref 10–40)
BASOPHILS # BLD AUTO: 0.05 K/UL (ref 0–0.2)
BASOPHILS NFR BLD: 0.6 % (ref 0–1.9)
BILIRUB SERPL-MCNC: 0.2 MG/DL (ref 0.1–1)
BUN SERPL-MCNC: 11 MG/DL (ref 6–20)
CALCIUM SERPL-MCNC: 9.1 MG/DL (ref 8.7–10.5)
CHLORIDE SERPL-SCNC: 105 MMOL/L (ref 95–110)
CHOLEST SERPL-MCNC: 196 MG/DL (ref 120–199)
CHOLEST/HDLC SERPL: 2.5 {RATIO} (ref 2–5)
CO2 SERPL-SCNC: 24 MMOL/L (ref 23–29)
CREAT SERPL-MCNC: 0.7 MG/DL (ref 0.5–1.4)
DIFFERENTIAL METHOD BLD: ABNORMAL
EOSINOPHIL # BLD AUTO: 0.2 K/UL (ref 0–0.5)
EOSINOPHIL NFR BLD: 1.9 % (ref 0–8)
ERYTHROCYTE [DISTWIDTH] IN BLOOD BY AUTOMATED COUNT: 13.1 % (ref 11.5–14.5)
EST. GFR  (NO RACE VARIABLE): >60 ML/MIN/1.73 M^2
ESTIMATED AVG GLUCOSE: 108 MG/DL (ref 68–131)
FERRITIN SERPL-MCNC: 59 NG/ML (ref 20–300)
GLUCOSE SERPL-MCNC: 90 MG/DL (ref 70–110)
HBA1C MFR BLD: 5.4 % (ref 4–5.6)
HCT VFR BLD AUTO: 35.6 % (ref 37–48.5)
HDLC SERPL-MCNC: 79 MG/DL (ref 40–75)
HDLC SERPL: 40.3 % (ref 20–50)
HGB BLD-MCNC: 11.2 G/DL (ref 12–16)
IMM GRANULOCYTES # BLD AUTO: 0.02 K/UL (ref 0–0.04)
IMM GRANULOCYTES NFR BLD AUTO: 0.3 % (ref 0–0.5)
IRON SERPL-MCNC: 45 UG/DL (ref 30–160)
LDLC SERPL CALC-MCNC: 101.4 MG/DL (ref 63–159)
LYMPHOCYTES # BLD AUTO: 2.8 K/UL (ref 1–4.8)
LYMPHOCYTES NFR BLD: 35.9 % (ref 18–48)
MCH RBC QN AUTO: 28.3 PG (ref 27–31)
MCHC RBC AUTO-ENTMCNC: 31.5 G/DL (ref 32–36)
MCV RBC AUTO: 90 FL (ref 82–98)
MONOCYTES # BLD AUTO: 0.8 K/UL (ref 0.3–1)
MONOCYTES NFR BLD: 10.5 % (ref 4–15)
NEUTROPHILS # BLD AUTO: 3.9 K/UL (ref 1.8–7.7)
NEUTROPHILS NFR BLD: 50.8 % (ref 38–73)
NONHDLC SERPL-MCNC: 117 MG/DL
NRBC BLD-RTO: 0 /100 WBC
PLATELET # BLD AUTO: 365 K/UL (ref 150–450)
PMV BLD AUTO: 9.8 FL (ref 9.2–12.9)
POTASSIUM SERPL-SCNC: 4.2 MMOL/L (ref 3.5–5.1)
PROT SERPL-MCNC: 7.2 G/DL (ref 6–8.4)
RBC # BLD AUTO: 3.96 M/UL (ref 4–5.4)
SATURATED IRON: 11 % (ref 20–50)
SODIUM SERPL-SCNC: 138 MMOL/L (ref 136–145)
TOTAL IRON BINDING CAPACITY: 423 UG/DL (ref 250–450)
TRANSFERRIN SERPL-MCNC: 286 MG/DL (ref 200–375)
TRIGL SERPL-MCNC: 78 MG/DL (ref 30–150)
TSH SERPL DL<=0.005 MIU/L-ACNC: 1.31 UIU/ML (ref 0.4–4)
WBC # BLD AUTO: 7.75 K/UL (ref 3.9–12.7)

## 2025-02-26 PROCEDURE — 83036 HEMOGLOBIN GLYCOSYLATED A1C: CPT | Performed by: INTERNAL MEDICINE

## 2025-02-26 PROCEDURE — 80053 COMPREHEN METABOLIC PANEL: CPT | Performed by: INTERNAL MEDICINE

## 2025-02-26 PROCEDURE — 99999 PR PBB SHADOW E&M-EST. PATIENT-LVL III: CPT | Mod: PBBFAC,,, | Performed by: INTERNAL MEDICINE

## 2025-02-26 PROCEDURE — 82728 ASSAY OF FERRITIN: CPT | Performed by: INTERNAL MEDICINE

## 2025-02-26 PROCEDURE — 85025 COMPLETE CBC W/AUTO DIFF WBC: CPT | Performed by: INTERNAL MEDICINE

## 2025-02-26 PROCEDURE — 84466 ASSAY OF TRANSFERRIN: CPT | Performed by: INTERNAL MEDICINE

## 2025-02-26 PROCEDURE — 80061 LIPID PANEL: CPT | Performed by: INTERNAL MEDICINE

## 2025-02-26 PROCEDURE — 84443 ASSAY THYROID STIM HORMONE: CPT | Performed by: INTERNAL MEDICINE

## 2025-02-26 NOTE — PROGRESS NOTES
[facility] Note    Chief Complaint      Chief Complaint   Patient presents with    Annual Exam     History of Present Illness      Gia Sawant is a 34 y.o. female who presents today for Annual preventative visit.  Patient comes to appointment alone.  GYN: Jana    Has not been motivated to exercise.  Still travelling and doing things, denies depression.  Getting 6-7 hours of sleep per night.  Has been having some low back pain when walking long distances.    Problem List Items Addressed This Visit       Class 3 severe obesity due to excess calories without serious comorbidity with body mass index (BMI) of 45.0 to 49.9 in adult    Current Assessment & Plan   Has not been exercising.  Has been eating more fast food/prepared food.           Other Visit Diagnoses         Annual physical exam    -  Primary    Relevant Orders    CBC Auto Differential    Lipid Panel    Comprehensive Metabolic Panel      Iron deficiency        Relevant Orders    Iron and TIBC    Ferritin      Weight gain        Relevant Orders    TSH            Health Maintenance   Topic Date Due    Pneumococcal Vaccines (Age 0-49) (1 of 2 - PCV) Never done    COVID-19 Vaccine (3 - 2024-25 season) 02/26/2026 (Originally 9/1/2024)    Cervical Cancer Screening  05/31/2028    TETANUS VACCINE  11/15/2034    RSV Vaccine (Age 60+ and Pregnant patients) (1 - 1-dose 75+ series) 07/25/2065    Hepatitis C Screening  Completed    HIV Screening  Completed    Lipid Panel  Completed    Influenza Vaccine  Addressed       History reviewed. No pertinent past medical history.    History reviewed. No pertinent surgical history.    family history includes Arthritis in her father; Breast cancer in an other family member; Cancer in her mother and another family member; Colon cancer in her paternal aunt and another family member; Diabetes in her father, mother, sister, and sister; Heart disease in her father, maternal grandfather, and maternal grandmother;  "Heart failure in her paternal grandfather; Hyperlipidemia in her father and mother; Hypertension in her father, mother, sister, and sister; Lung cancer in an other family member; Miscarriages / Stillbirths in her mother and sister; Other in her mother; Seizures in her mother; Stomach cancer (age of onset: 63) in her mother; Stroke in her father.    Social History     Tobacco Use    Smoking status: Never    Smokeless tobacco: Never   Substance Use Topics    Alcohol use: Yes     Comment: Special occasions (holidays, birthdays)    Drug use: Not Currently     Types: Marijuana     Comment: While on vacation       Review of Systems   Constitutional:  Negative for chills and fever.   Respiratory:  Negative for cough and shortness of breath.    Cardiovascular:  Negative for chest pain and palpitations.   Gastrointestinal:  Negative for constipation, diarrhea, nausea and vomiting.   Genitourinary:  Negative for dysuria and hematuria.   Musculoskeletal:  Negative for falls.   Neurological:  Negative for headaches.        Outpatient Encounter Medications as of 2/26/2025   Medication Sig Dispense Refill    biotin 10,000 mcg Chew Take 1 capsule by mouth once daily.      cinnamon bark 500 mg capsule Take 1 capsule by mouth once daily.      iron,carb/vit C/vit B12/folic (IRON 100 PLUS ORAL)       Lacto-Bif-Sac-Bacil-Strep-bact (MVW COMPLETE FORMUL PROBIOTIC) 40 billion cell -15 mg CpDR Take 1 capsule by mouth once daily.      omega-3 fatty acids fish oil 1,050-1,200 mg Cap capsule Take 1 capsule by mouth once daily.      [DISCONTINUED] safflower oil-linoleic acid,co (CLA) 1,000 mg Cap Take 1,000 mg by mouth once daily.       No facility-administered encounter medications on file as of 2/26/2025.        Review of patient's allergies indicates:  No Known Allergies    Physical Exam      Vital Signs  Pulse: 96  SpO2: 98 %  BP: 122/78  Pain Score: 0-No pain  Height and Weight  Height: 5' 10" (177.8 cm)  Weight: (!) 142.7 kg (314 lb 9.5 " "oz)  BSA (Calculated - sq m): 2.65 sq meters  BMI (Calculated): 45.1  Weight in (lb) to have BMI = 25: 173.9]    Physical Exam  Constitutional:       Appearance: She is well-developed.   HENT:      Head: Normocephalic and atraumatic.   Cardiovascular:      Rate and Rhythm: Normal rate and regular rhythm.      Heart sounds: Normal heart sounds. No murmur heard.  Pulmonary:      Effort: Pulmonary effort is normal. No respiratory distress.      Breath sounds: Normal breath sounds.   Abdominal:      General: There is no distension.      Palpations: Abdomen is soft.      Tenderness: There is no abdominal tenderness. There is no guarding.   Skin:     General: Skin is warm and dry.   Neurological:      Mental Status: She is alert. Mental status is at baseline.   Psychiatric:         Behavior: Behavior normal.          Laboratory:  CBC:  No results for input(s): "WBC", "RBC", "HGB", "HCT", "PLT", "MCV", "MCH", "MCHC" in the last 2160 hours.  CMP:  No results for input(s): "GLU", "CALCIUM", "ALBUMIN", "PROT", "NA", "K", "CO2", "CL", "BUN", "ALKPHOS", "ALT", "AST", "BILITOT" in the last 2160 hours.    Invalid input(s): "CREATININ"  URINALYSIS:  No results for input(s): "COLORU", "CLARITYU", "SPECGRAV", "PHUR", "PROTEINUA", "GLUCOSEU", "BILIRUBINCON", "BLOODU", "WBCU", "RBCU", "BACTERIA", "MUCUS", "NITRITE", "LEUKOCYTESUR", "UROBILINOGEN", "HYALINECASTS" in the last 2160 hours.   LIPIDS:  No results for input(s): "TSH", "HDL", "CHOL", "TRIG", "LDLCALC", "CHOLHDL", "NONHDLCHOL", "TOTALCHOLEST" in the last 2160 hours.  TSH:  No results for input(s): "TSH" in the last 2160 hours.  A1C:  No results for input(s): "HGBA1C" in the last 2160 hours.    Radiology:  No results found in the last 30 days.     Assessment/Plan     Gia Sawant is a 34 y.o.female with:    1. Annual physical exam  - CBC Auto Differential; Future  - Lipid Panel; Future  - Comprehensive Metabolic Panel; Future    2. Class 3 severe obesity due to excess " calories without serious comorbidity with body mass index (BMI) of 40.0 to 44.9 in adult  - Hemoglobin A1C; Future    3. Iron deficiency  - Iron and TIBC; Future  - Ferritin; Future    4. Weight gain  - TSH; Future    5. Class 3 severe obesity due to excess calories without serious comorbidity with body mass index (BMI) of 45.0 to 49.9 in adult    -check labs  -counseled on increasing exercise and working on diet  -Continue current medications and maintain follow up with specialists.    -No follow-ups on file.       Milla Greenberg MD  [facility]

## 2025-02-27 ENCOUNTER — RESULTS FOLLOW-UP (OUTPATIENT)
Dept: PRIMARY CARE CLINIC | Facility: CLINIC | Age: 35
End: 2025-02-27
Payer: COMMERCIAL

## 2025-07-09 ENCOUNTER — LAB VISIT (OUTPATIENT)
Dept: LAB | Facility: HOSPITAL | Age: 35
End: 2025-07-09
Attending: STUDENT IN AN ORGANIZED HEALTH CARE EDUCATION/TRAINING PROGRAM
Payer: COMMERCIAL

## 2025-07-09 ENCOUNTER — OFFICE VISIT (OUTPATIENT)
Dept: OBSTETRICS AND GYNECOLOGY | Facility: CLINIC | Age: 35
End: 2025-07-09
Payer: COMMERCIAL

## 2025-07-09 VITALS
WEIGHT: 292.56 LBS | BODY MASS INDEX: 41.88 KG/M2 | HEIGHT: 70 IN | SYSTOLIC BLOOD PRESSURE: 138 MMHG | DIASTOLIC BLOOD PRESSURE: 85 MMHG

## 2025-07-09 DIAGNOSIS — Z01.419 ENCOUNTER FOR GYNECOLOGICAL EXAMINATION: Primary | ICD-10-CM

## 2025-07-09 DIAGNOSIS — R53.83 FATIGUE, UNSPECIFIED TYPE: ICD-10-CM

## 2025-07-09 DIAGNOSIS — Z11.3 SCREENING EXAMINATION FOR STI: ICD-10-CM

## 2025-07-09 LAB
25(OH)D3+25(OH)D2 SERPL-MCNC: 36 NG/ML (ref 30–96)
HBV SURFACE AG SERPL QL IA: NORMAL
HCV AB SERPL QL IA: NORMAL
HIV 1+2 AB+HIV1 P24 AG SERPL QL IA: NORMAL
T PALLIDUM IGG+IGM SER QL: NORMAL

## 2025-07-09 PROCEDURE — 3075F SYST BP GE 130 - 139MM HG: CPT | Mod: CPTII,S$GLB,, | Performed by: STUDENT IN AN ORGANIZED HEALTH CARE EDUCATION/TRAINING PROGRAM

## 2025-07-09 PROCEDURE — 87340 HEPATITIS B SURFACE AG IA: CPT

## 2025-07-09 PROCEDURE — 99395 PREV VISIT EST AGE 18-39: CPT | Mod: S$GLB,,, | Performed by: STUDENT IN AN ORGANIZED HEALTH CARE EDUCATION/TRAINING PROGRAM

## 2025-07-09 PROCEDURE — 3079F DIAST BP 80-89 MM HG: CPT | Mod: CPTII,S$GLB,, | Performed by: STUDENT IN AN ORGANIZED HEALTH CARE EDUCATION/TRAINING PROGRAM

## 2025-07-09 PROCEDURE — 99999 PR PBB SHADOW E&M-EST. PATIENT-LVL III: CPT | Mod: PBBFAC,,, | Performed by: STUDENT IN AN ORGANIZED HEALTH CARE EDUCATION/TRAINING PROGRAM

## 2025-07-09 PROCEDURE — 36415 COLL VENOUS BLD VENIPUNCTURE: CPT

## 2025-07-09 PROCEDURE — 3008F BODY MASS INDEX DOCD: CPT | Mod: CPTII,S$GLB,, | Performed by: STUDENT IN AN ORGANIZED HEALTH CARE EDUCATION/TRAINING PROGRAM

## 2025-07-09 PROCEDURE — 82306 VITAMIN D 25 HYDROXY: CPT

## 2025-07-09 PROCEDURE — 1160F RVW MEDS BY RX/DR IN RCRD: CPT | Mod: CPTII,S$GLB,, | Performed by: STUDENT IN AN ORGANIZED HEALTH CARE EDUCATION/TRAINING PROGRAM

## 2025-07-09 PROCEDURE — 3044F HG A1C LEVEL LT 7.0%: CPT | Mod: CPTII,S$GLB,, | Performed by: STUDENT IN AN ORGANIZED HEALTH CARE EDUCATION/TRAINING PROGRAM

## 2025-07-09 PROCEDURE — 1159F MED LIST DOCD IN RCRD: CPT | Mod: CPTII,S$GLB,, | Performed by: STUDENT IN AN ORGANIZED HEALTH CARE EDUCATION/TRAINING PROGRAM

## 2025-07-09 PROCEDURE — 87389 HIV-1 AG W/HIV-1&-2 AB AG IA: CPT

## 2025-07-09 PROCEDURE — 86803 HEPATITIS C AB TEST: CPT

## 2025-07-09 PROCEDURE — 86593 SYPHILIS TEST NON-TREP QUANT: CPT

## 2025-07-09 NOTE — PROGRESS NOTES
"Chief Complaint: Well Woman Exam     HPI:      Gia Sawant is a 34 y.o. No obstetric history on file. who presents today for well woman exam.  LMP: Patient's last menstrual period was 06/17/2025 (exact date).    Today patient's GYN complaints include: none.  Desires vitamin D level be checked - occasional fatigue.  Specifically, patient denies abnormal vaginal bleeding, discharge, pelvic pain, urinary problems.    Ms. Sawant is currently sexually active with a single male partner. She is currently using condoms for contraception. She would like STD screening today.    Previous Pap: NILM, HPV negative (6/7/2023)      History reviewed. No pertinent past medical history.    History reviewed. No pertinent surgical history.    Social History[1]    Family History   Problem Relation Name Age of Onset    Other Mother BAD         pituitary tumor (benign?); benign breast tumors    Diabetes Mother BAD     Hyperlipidemia Mother BAD     Hypertension Mother BAD     Miscarriages / Stillbirths Mother BAD     Stomach cancer Mother BAD 63        de mani stage IV    Cancer Mother BAD     Seizures Mother BAD     Arthritis Father CD     Diabetes Father CD     Heart disease Father CD     Hyperlipidemia Father CD     Hypertension Father CD     Stroke Father CD     Diabetes Sister [facility]     Hypertension Sister [facility]     Diabetes Sister IVAN     Hypertension Sister IVAN     Miscarriages / Stillbirths Sister IVAN     Colon cancer Paternal Aunt Karena         surv'd a few yrs    Heart disease Maternal Grandfather [facility]     Heart disease Maternal Grandmother [facility]     Breast cancer Other      Cancer Other          type?    Lung cancer Other      Heart failure Paternal Grandfather CD     Colon cancer Other         Review of patient's allergies indicates:  No Known Allergies    OB History    No obstetric history on file.         Physical Exam:      PHYSICAL EXAM:  /85   Ht 5' 10" (1.778 m)   Wt 132.7 kg (292 lb 8.8 oz)   LMP 06/17/2025 (Exact " Date)   BMI 41.98 kg/m²   Body mass index is 41.98 kg/m².     APPEARANCE: Well nourished, well developed, in no acute distress.  PSYCH: Appropriate mood and affect.  SKIN: No acne or hirsutism  NECK: Neck symmetric without masses  NODES: No inguinal, axillary, or supraclavicular lymph node enlargement  ABDOMEN: Soft.  No tenderness or masses.    CARDIOVASCULAR: No edema of peripheral extremities  BREASTS: Symmetrical, no visible skin lesions. No palpable masses. No nipple discharge bilaterally.  PELVIC: Normal external genitalia without lesions.  Normal hair distribution.  Adequate perineal body, normal urethral meatus.  Vagina moist and well rugated. Without lesions. With normal discharge.  Cervix pink, without lesions, discharge or tenderness.  No significant cystocele or rectocele.  Bimanual exam shows uterus to be normal size, regular, mobile and nontender.  Adnexa without masses or tenderness.      Assessment/Plan:     Encounter for gynecological examination    Screening examination for STI  -     C. trachomatis/N. gonorrhoeae by AMP DNA  -     HIV 1/2 Ag/Ab (4th Gen); Future; Expected date: 07/09/2025  -     HEPATITIS B SURFACE ANTIGEN; Future; Expected date: 07/09/2025  -     HEPATITIS C ANTIBODY; Future; Expected date: 07/09/2025  -     Treponema Pallidium Antibodies IgG, IgM; Future; Expected date: 07/09/2025    Fatigue, unspecified type  -     Vitamin D; Future; Expected date: 07/09/2025      - pelvic exam normal  - pap/hpv due 2026  - STI panel, gc/ct collected  - RTC 1 yr or sooner prn    Counseling:     Patient was counseled today on current ASCCP pap guidelines, the recommendation for yearly physical exams, safe driving habits, breast self awareness. She is to see her PCP for other health maintenance.     Use of the [employer] Patient Portal discussed and encouraged during today's visit.          [1]   Social History  Socioeconomic History    Marital status: Single   Tobacco Use    Smoking status: Never     Smokeless tobacco: Never   Substance and Sexual Activity    Alcohol use: Yes     Comment: Special occasions (holidays, birthdays)    Drug use: Not Currently     Types: Marijuana     Comment: While on vacation    Sexual activity: Yes     Partners: Male     Birth control/protection: Coitus interruptus, Condom